# Patient Record
Sex: MALE | Race: WHITE | NOT HISPANIC OR LATINO | Employment: STUDENT | URBAN - METROPOLITAN AREA
[De-identification: names, ages, dates, MRNs, and addresses within clinical notes are randomized per-mention and may not be internally consistent; named-entity substitution may affect disease eponyms.]

---

## 2021-10-12 ENCOUNTER — TELEPHONE (OUTPATIENT)
Dept: ENDOCRINOLOGY | Facility: HOSPITAL | Age: 21
End: 2021-10-12

## 2021-10-14 ENCOUNTER — TELEPHONE (OUTPATIENT)
Dept: ENDOCRINOLOGY | Facility: HOSPITAL | Age: 21
End: 2021-10-14

## 2021-10-14 ENCOUNTER — CONSULT (OUTPATIENT)
Dept: ENDOCRINOLOGY | Facility: HOSPITAL | Age: 21
End: 2021-10-14
Payer: COMMERCIAL

## 2021-10-14 ENCOUNTER — OFFICE VISIT (OUTPATIENT)
Dept: DIABETES SERVICES | Facility: HOSPITAL | Age: 21
End: 2021-10-14
Payer: COMMERCIAL

## 2021-10-14 VITALS
HEIGHT: 72 IN | HEART RATE: 96 BPM | DIASTOLIC BLOOD PRESSURE: 70 MMHG | SYSTOLIC BLOOD PRESSURE: 110 MMHG | WEIGHT: 178.2 LBS | BODY MASS INDEX: 24.14 KG/M2

## 2021-10-14 VITALS — BODY MASS INDEX: 24.11 KG/M2 | HEIGHT: 72 IN | WEIGHT: 178 LBS

## 2021-10-14 DIAGNOSIS — E10.9 TYPE 1 DIABETES MELLITUS WITHOUT COMPLICATION (HCC): Primary | ICD-10-CM

## 2021-10-14 PROCEDURE — 99244 OFF/OP CNSLTJ NEW/EST MOD 40: CPT | Performed by: INTERNAL MEDICINE

## 2021-10-14 PROCEDURE — G0108 DIAB MANAGE TRN  PER INDIV: HCPCS | Performed by: DIETITIAN, REGISTERED

## 2021-10-14 RX ORDER — DEXMETHYLPHENIDATE HYDROCHLORIDE 10 MG/1
10 CAPSULE, EXTENDED RELEASE ORAL DAILY
COMMUNITY
Start: 2021-10-11

## 2021-10-14 RX ORDER — BLOOD SUGAR DIAGNOSTIC
STRIP MISCELLANEOUS
Qty: 100 EACH | Refills: 5 | Status: SHIPPED | OUTPATIENT
Start: 2021-10-14

## 2021-10-14 RX ORDER — INSULIN GLARGINE 100 [IU]/ML
INJECTION, SOLUTION SUBCUTANEOUS
Qty: 15 ML | Refills: 3 | Status: SHIPPED | OUTPATIENT
Start: 2021-10-14 | End: 2021-10-20 | Stop reason: SDUPTHER

## 2021-10-14 RX ORDER — GLUCAGON 3 MG/1
POWDER NASAL
Qty: 1 EACH | Refills: 0 | Status: SHIPPED | OUTPATIENT
Start: 2021-10-14

## 2021-10-14 RX ORDER — FLUOXETINE HYDROCHLORIDE 40 MG/1
80 CAPSULE ORAL DAILY
COMMUNITY
Start: 2021-10-06

## 2021-10-14 RX ORDER — BLOOD-GLUCOSE METER
EACH MISCELLANEOUS
Qty: 1 KIT | Refills: 0 | Status: SHIPPED | OUTPATIENT
Start: 2021-10-14

## 2021-10-14 RX ORDER — PEN NEEDLE, DIABETIC 32GX 5/32"
NEEDLE, DISPOSABLE MISCELLANEOUS
Qty: 100 EACH | Refills: 3 | Status: SHIPPED | OUTPATIENT
Start: 2021-10-14 | End: 2022-02-22 | Stop reason: SDUPTHER

## 2021-10-14 RX ORDER — URINE ACETONE TEST STRIPS
STRIP MISCELLANEOUS
Qty: 25 EACH | Refills: 0 | Status: SHIPPED | OUTPATIENT
Start: 2021-10-14

## 2021-10-14 RX ORDER — LANCETS
EACH MISCELLANEOUS
Qty: 100 EACH | Refills: 3 | Status: SHIPPED | OUTPATIENT
Start: 2021-10-14

## 2021-10-21 ENCOUNTER — DOCUMENTATION (OUTPATIENT)
Dept: ENDOCRINOLOGY | Facility: HOSPITAL | Age: 21
End: 2021-10-21

## 2021-10-21 DIAGNOSIS — E10.9 TYPE 1 DIABETES MELLITUS WITHOUT COMPLICATION (HCC): Primary | ICD-10-CM

## 2021-10-21 RX ORDER — INSULIN LISPRO 100 [IU]/ML
INJECTION, SOLUTION INTRAVENOUS; SUBCUTANEOUS
Qty: 15 ML | Refills: 0 | Status: SHIPPED | OUTPATIENT
Start: 2021-10-21 | End: 2021-10-21

## 2021-10-25 ENCOUNTER — TELEPHONE (OUTPATIENT)
Dept: ENDOCRINOLOGY | Facility: HOSPITAL | Age: 21
End: 2021-10-25

## 2021-10-25 ENCOUNTER — PATIENT MESSAGE (OUTPATIENT)
Dept: ENDOCRINOLOGY | Facility: HOSPITAL | Age: 21
End: 2021-10-25

## 2021-11-01 ENCOUNTER — TELEPHONE (OUTPATIENT)
Dept: ENDOCRINOLOGY | Facility: CLINIC | Age: 21
End: 2021-11-01

## 2021-11-11 ENCOUNTER — TELEPHONE (OUTPATIENT)
Dept: ENDOCRINOLOGY | Facility: HOSPITAL | Age: 21
End: 2021-11-11

## 2021-11-15 LAB
25(OH)D3+25(OH)D2 SERPL-MCNC: 15.6 NG/ML (ref 30–100)
ALBUMIN SERPL-MCNC: 4.5 G/DL (ref 4.1–5.2)
ALBUMIN/CREAT UR: 3 MG/G CREAT (ref 0–29)
ALBUMIN/GLOB SERPL: 2 {RATIO} (ref 1.2–2.2)
ALP SERPL-CCNC: 80 IU/L (ref 44–121)
ALT SERPL-CCNC: 22 IU/L (ref 0–44)
AST SERPL-CCNC: 16 IU/L (ref 0–40)
BASOPHILS # BLD AUTO: 0.1 X10E3/UL (ref 0–0.2)
BASOPHILS NFR BLD AUTO: 1 %
BILIRUB SERPL-MCNC: 0.3 MG/DL (ref 0–1.2)
BUN SERPL-MCNC: 28 MG/DL (ref 6–20)
BUN/CREAT SERPL: 32 (ref 9–20)
CALCIUM SERPL-MCNC: 9.3 MG/DL (ref 8.7–10.2)
CHLORIDE SERPL-SCNC: 102 MMOL/L (ref 96–106)
CO2 SERPL-SCNC: 24 MMOL/L (ref 20–29)
CREAT SERPL-MCNC: 0.87 MG/DL (ref 0.76–1.27)
CREAT UR-MCNC: 182.6 MG/DL
ENDOMYSIUM IGA SER QL: NEGATIVE
EOSINOPHIL # BLD AUTO: 0.1 X10E3/UL (ref 0–0.4)
EOSINOPHIL NFR BLD AUTO: 2 %
ERYTHROCYTE [DISTWIDTH] IN BLOOD BY AUTOMATED COUNT: 12.3 % (ref 11.6–15.4)
GLIADIN PEPTIDE IGA SER-ACNC: 6 UNITS (ref 0–19)
GLIADIN PEPTIDE IGG SER-ACNC: 7 UNITS (ref 0–19)
GLOBULIN SER-MCNC: 2.2 G/DL (ref 1.5–4.5)
GLUCOSE SERPL-MCNC: 92 MG/DL (ref 65–99)
HCT VFR BLD AUTO: 44.7 % (ref 37.5–51)
HGB BLD-MCNC: 15.2 G/DL (ref 13–17.7)
IGA SERPL-MCNC: 214 MG/DL (ref 90–386)
IMM GRANULOCYTES # BLD: 0.1 X10E3/UL (ref 0–0.1)
IMM GRANULOCYTES NFR BLD: 2 %
LYMPHOCYTES # BLD AUTO: 1.6 X10E3/UL (ref 0.7–3.1)
LYMPHOCYTES NFR BLD AUTO: 35 %
MCH RBC QN AUTO: 29 PG (ref 26.6–33)
MCHC RBC AUTO-ENTMCNC: 34 G/DL (ref 31.5–35.7)
MCV RBC AUTO: 85 FL (ref 79–97)
MICROALBUMIN UR-MCNC: 5.9 UG/ML
MONOCYTES # BLD AUTO: 0.4 X10E3/UL (ref 0.1–0.9)
MONOCYTES NFR BLD AUTO: 8 %
NEUTROPHILS # BLD AUTO: 2.5 X10E3/UL (ref 1.4–7)
NEUTROPHILS NFR BLD AUTO: 52 %
PLATELET # BLD AUTO: 190 X10E3/UL (ref 150–450)
POTASSIUM SERPL-SCNC: 4.6 MMOL/L (ref 3.5–5.2)
PROT SERPL-MCNC: 6.7 G/DL (ref 6–8.5)
RBC # BLD AUTO: 5.24 X10E6/UL (ref 4.14–5.8)
SL AMB EGFR AFRICAN AMERICAN: 143 ML/MIN/1.73
SL AMB EGFR NON AFRICAN AMERICAN: 123 ML/MIN/1.73
SODIUM SERPL-SCNC: 140 MMOL/L (ref 134–144)
T4 FREE SERPL-MCNC: 1.26 NG/DL (ref 0.82–1.77)
TSH SERPL DL<=0.005 MIU/L-ACNC: 1.5 UIU/ML (ref 0.45–4.5)
TTG IGA SER-ACNC: <2 U/ML (ref 0–3)
TTG IGG SER-ACNC: 4 U/ML (ref 0–5)
WBC # BLD AUTO: 4.8 X10E3/UL (ref 3.4–10.8)

## 2021-11-24 ENCOUNTER — OFFICE VISIT (OUTPATIENT)
Dept: DIABETES SERVICES | Facility: HOSPITAL | Age: 21
End: 2021-11-24
Payer: COMMERCIAL

## 2021-11-24 VITALS — HEIGHT: 72 IN | BODY MASS INDEX: 24.11 KG/M2 | WEIGHT: 178 LBS

## 2021-11-24 DIAGNOSIS — E10.9 TYPE 1 DIABETES MELLITUS WITHOUT COMPLICATION (HCC): Primary | ICD-10-CM

## 2021-11-24 PROCEDURE — 97802 MEDICAL NUTRITION INDIV IN: CPT | Performed by: DIETITIAN, REGISTERED

## 2021-12-09 ENCOUNTER — TELEPHONE (OUTPATIENT)
Dept: ENDOCRINOLOGY | Facility: HOSPITAL | Age: 21
End: 2021-12-09

## 2022-01-03 DIAGNOSIS — E10.9 TYPE 1 DIABETES MELLITUS WITHOUT COMPLICATION (HCC): ICD-10-CM

## 2022-01-03 RX ORDER — BLOOD-GLUCOSE SENSOR
EACH MISCELLANEOUS
Qty: 3 EACH | Refills: 12 | Status: SHIPPED | OUTPATIENT
Start: 2022-01-03 | End: 2022-01-26 | Stop reason: SDUPTHER

## 2022-01-03 RX ORDER — BLOOD-GLUCOSE TRANSMITTER
EACH MISCELLANEOUS
Qty: 1 EACH | Refills: 3 | Status: SHIPPED | OUTPATIENT
Start: 2022-01-03 | End: 2022-01-26 | Stop reason: SDUPTHER

## 2022-01-06 LAB
ALBUMIN SERPL-MCNC: 4.6 G/DL (ref 4.1–5.2)
ALBUMIN/GLOB SERPL: 1.9 {RATIO} (ref 1.2–2.2)
ALP SERPL-CCNC: 93 IU/L (ref 44–121)
ALT SERPL-CCNC: 26 IU/L (ref 0–44)
AST SERPL-CCNC: 16 IU/L (ref 0–40)
BASOPHILS # BLD AUTO: 0 X10E3/UL (ref 0–0.2)
BASOPHILS NFR BLD AUTO: 1 %
BILIRUB SERPL-MCNC: 0.3 MG/DL (ref 0–1.2)
BUN SERPL-MCNC: 21 MG/DL (ref 6–20)
BUN/CREAT SERPL: 20 (ref 9–20)
CALCIUM SERPL-MCNC: 9.8 MG/DL (ref 8.7–10.2)
CHLORIDE SERPL-SCNC: 99 MMOL/L (ref 96–106)
CHOLEST SERPL-MCNC: 293 MG/DL (ref 100–199)
CO2 SERPL-SCNC: 29 MMOL/L (ref 20–29)
CREAT SERPL-MCNC: 1.06 MG/DL (ref 0.76–1.27)
EOSINOPHIL # BLD AUTO: 0.1 X10E3/UL (ref 0–0.4)
EOSINOPHIL NFR BLD AUTO: 1 %
ERYTHROCYTE [DISTWIDTH] IN BLOOD BY AUTOMATED COUNT: 12.6 % (ref 11.6–15.4)
GLOBULIN SER-MCNC: 2.4 G/DL (ref 1.5–4.5)
GLUCOSE SERPL-MCNC: 88 MG/DL (ref 65–99)
HBA1C MFR BLD: 6.1 % (ref 4.8–5.6)
HCT VFR BLD AUTO: 46.4 % (ref 37.5–51)
HDLC SERPL-MCNC: 40 MG/DL
HGB BLD-MCNC: 15.7 G/DL (ref 13–17.7)
IMM GRANULOCYTES # BLD: 0.1 X10E3/UL (ref 0–0.1)
IMM GRANULOCYTES NFR BLD: 1 %
LDLC SERPL CALC-MCNC: 222 MG/DL (ref 0–99)
LDLC/HDLC SERPL: 5.6 RATIO (ref 0–3.6)
LYMPHOCYTES # BLD AUTO: 2.6 X10E3/UL (ref 0.7–3.1)
LYMPHOCYTES NFR BLD AUTO: 43 %
MCH RBC QN AUTO: 28.5 PG (ref 26.6–33)
MCHC RBC AUTO-ENTMCNC: 33.8 G/DL (ref 31.5–35.7)
MCV RBC AUTO: 84 FL (ref 79–97)
MONOCYTES # BLD AUTO: 0.5 X10E3/UL (ref 0.1–0.9)
MONOCYTES NFR BLD AUTO: 8 %
NEUTROPHILS # BLD AUTO: 2.8 X10E3/UL (ref 1.4–7)
NEUTROPHILS NFR BLD AUTO: 46 %
PLATELET # BLD AUTO: 198 X10E3/UL (ref 150–450)
POTASSIUM SERPL-SCNC: 4.9 MMOL/L (ref 3.5–5.2)
PROT SERPL-MCNC: 7 G/DL (ref 6–8.5)
RBC # BLD AUTO: 5.5 X10E6/UL (ref 4.14–5.8)
SL AMB EGFR AFRICAN AMERICAN: 115 ML/MIN/1.73
SL AMB EGFR NON AFRICAN AMERICAN: 100 ML/MIN/1.73
SL AMB VLDL CHOLESTEROL CALC: 31 MG/DL (ref 5–40)
SODIUM SERPL-SCNC: 139 MMOL/L (ref 134–144)
TRIGL SERPL-MCNC: 164 MG/DL (ref 0–149)
TSH SERPL DL<=0.005 MIU/L-ACNC: 1.76 UIU/ML (ref 0.45–4.5)
WBC # BLD AUTO: 6.1 X10E3/UL (ref 3.4–10.8)

## 2022-01-11 DIAGNOSIS — E10.9 TYPE 1 DIABETES MELLITUS WITHOUT COMPLICATION (HCC): Primary | ICD-10-CM

## 2022-01-11 RX ORDER — INSULIN GLARGINE 100 [IU]/ML
20 INJECTION, SOLUTION SUBCUTANEOUS DAILY
Qty: 15 ML | Refills: 2 | Status: SHIPPED | OUTPATIENT
Start: 2022-01-11 | End: 2022-01-14 | Stop reason: SDUPTHER

## 2022-01-14 ENCOUNTER — OFFICE VISIT (OUTPATIENT)
Dept: ENDOCRINOLOGY | Facility: HOSPITAL | Age: 22
End: 2022-01-14
Payer: COMMERCIAL

## 2022-01-14 VITALS
DIASTOLIC BLOOD PRESSURE: 80 MMHG | BODY MASS INDEX: 25.41 KG/M2 | HEIGHT: 72 IN | OXYGEN SATURATION: 97 % | SYSTOLIC BLOOD PRESSURE: 132 MMHG | WEIGHT: 187.6 LBS | HEART RATE: 90 BPM

## 2022-01-14 DIAGNOSIS — E10.9 TYPE 1 DIABETES MELLITUS WITHOUT COMPLICATION (HCC): Primary | ICD-10-CM

## 2022-01-14 PROCEDURE — 99214 OFFICE O/P EST MOD 30 MIN: CPT | Performed by: INTERNAL MEDICINE

## 2022-01-14 RX ORDER — INSULIN LISPRO 100 [IU]/ML
INJECTION, SOLUTION INTRAVENOUS; SUBCUTANEOUS
Qty: 15 ML | Refills: 5 | Status: SHIPPED | OUTPATIENT
Start: 2022-01-14 | End: 2022-08-08

## 2022-01-14 RX ORDER — TRAZODONE HYDROCHLORIDE 50 MG/1
150 TABLET ORAL
COMMUNITY
Start: 2022-01-13

## 2022-01-14 RX ORDER — INSULIN GLARGINE 100 [IU]/ML
INJECTION, SOLUTION SUBCUTANEOUS
Qty: 15 ML | Refills: 2
Start: 2022-01-14 | End: 2022-06-15 | Stop reason: SDUPTHER

## 2022-01-14 NOTE — PROGRESS NOTES
1/14/2022    Assessment/Plan      Diagnoses and all orders for this visit:    Type 1 diabetes mellitus without complication (HCC)  -     Hemoglobin A1C; Future  -     Comprehensive metabolic panel; Future  -     Lipid Panel with Direct LDL reflex; Future  -     CBC and differential; Future  -     TSH, 3rd generation; Future  -     C-peptide Lab Collect; Future  -     insulin glargine (Basaglar KwikPen) 100 units/mL injection pen; 16 units daily  -     insulin lispro (HumaLOG KwikPen) 100 units/mL injection pen; Up to 25 units daily    Other orders  -     traZODone (DESYREL) 50 mg tablet        Assessment/Plan:  1  Type 1 diabetes:  Overall based on A1c as well as Dexcom data he is very well controlled  To try and decrease some of his values that approach low and are low, we will decrease Basaglar to 16 units daily  Continue current Humalog regimen  Given that he is only requiring a low dose of mealtime insulin with 1 meal of the day and often does not appear to have a rise in blood sugars after carbohydrate intake with breakfast and lunch I believe he is in the honeymoon phase  We will need to monitor this closely  Will review dex com download again in a few weeks  Plan for follow-up in 3-4 months  We did briefly discussed considering carbohydrate counting as well as possible insulin pump technology though if he is in the honeymoon phase and not requiring significant amount of mealtime insulin such as would be required with flexible insulin therapy we will continue monitor on current regimen for now  CC: Diabetes follow-up    History of Present Illness     HPI: Aimee Malloy is a 24y o  year old male with type 1 diabetes for <1 year initially seen in our office for newly dx diabetes in October 2021  He is on insulin at home and takes Basaglar 20 unis daily, Novolog 3 units with dinner  He denies any polyuria, polydipsia, nocturia and blurry vision  He denies neuropathy, nephropathy and retinopathy  During workup he did have positive prema antibodies  Hypoglycemic episodes: Yes but infrequently  At 1st few appointments we did discuss treatment of hypoglycemia as well as he does have intranasal glucagon in case of severe hypoglycemia  Blood Sugar/Glucometer/Pump/CGM review:  Dex com download from 01/01 through 1/14 shows an average glucose of 115 with a standard deviation of 30 and glucose management index of 6 1%  93% of the time CGM is in use  95% values are within range and less 1% are low  Review of Systems   Constitutional: Negative for fatigue  HENT: Negative for trouble swallowing and voice change  Eyes: Negative for visual disturbance  Respiratory: Negative for shortness of breath  Cardiovascular: Negative for palpitations and leg swelling  Gastrointestinal: Negative for abdominal pain, nausea and vomiting  Endocrine: Negative for polydipsia and polyuria  Musculoskeletal: Negative for arthralgias and myalgias  Skin: Negative for rash  Neurological: Negative for dizziness, tremors and weakness  Hematological: Negative for adenopathy  Psychiatric/Behavioral: Negative for agitation and confusion  Historical Information   History reviewed  No pertinent past medical history  History reviewed  No pertinent surgical history    Social History   Social History     Substance and Sexual Activity   Alcohol Use Not Currently    Comment: some beer with dinner      Social History     Substance and Sexual Activity   Drug Use Never     Social History     Tobacco Use   Smoking Status Never Smoker   Smokeless Tobacco Never Used     Family History:   Family History   Problem Relation Age of Onset   [facility] Migraines Mother     Heart disease Father     Addiction problem Father     Diabetes unspecified Father     Pancreatitis Father     No Known Problems Sister     Heart disease Maternal Grandmother     Heart disease Maternal Grandfather     Prostate cancer Maternal Grandfather Meds/Allergies   Current Outpatient Medications   Medication Sig Dispense Refill    Accu-Chek FastClix Lancets MISC 4 times daily 100 each 3    acetone, urine, test (Ketostix) strip Use prn hyperglycemia 25 each 0    Blood Glucose Monitoring Suppl (Accu-Chek Marquita Plus) w/Device KIT daily 1 kit 0    Continuous Blood Gluc  (Dexcom G6 ) IRMA As directed 1 each 0    Continuous Blood Gluc Sensor (Dexcom G6 Sensor) MISC q 10 days 3 each 12    Continuous Blood Gluc Transmit (Dexcom G6 Transmitter) MISC q 90 days 1 each 3    FLUoxetine (PROzac) 40 MG capsule Take 80 mg by mouth daily      Glucagon (Baqsimi One Pack) 3 MG/DOSE POWD 3 mg prn severe hypoglycemia 1 each 0    glucose blood (Accu-Chek Marquita Plus) test strip 4 times daily 100 each 5    insulin glargine (Basaglar KwikPen) 100 units/mL injection pen 16 units daily 15 mL 2    Insulin Pen Needle (BD Pen Needle Ibis U/F) 32G X 4 MM MISC 4 times daily 100 each 3    traZODone (DESYREL) 50 mg tablet       Cholecalciferol 125 MCG (5000 UT) TABS 1 tab daily (Patient not taking: Reported on 1/14/2022 )      dexmethylphenidate (FOCALIN XR) 10 MG 24 hr capsule Take 10 mg by mouth daily (Patient not taking: Reported on 1/14/2022 )      insulin lispro (HumaLOG KwikPen) 100 units/mL injection pen Up to 25 units daily 15 mL 5     No current facility-administered medications for this visit  Allergies   Allergen Reactions    Bupropion Rash       Objective   Vitals: Blood pressure 132/80, pulse 90, height 6' (1 829 m), weight 85 1 kg (187 lb 9 6 oz), SpO2 97 %  Invasive Devices  Report    None                 Physical Exam  Vitals reviewed  Constitutional:       General: He is not in acute distress  Appearance: He is well-developed  He is not diaphoretic  HENT:      Head: Normocephalic and atraumatic  Eyes:      Conjunctiva/sclera: Conjunctivae normal       Pupils: Pupils are equal, round, and reactive to light     Neck: Thyroid: No thyromegaly  Cardiovascular:      Rate and Rhythm: Normal rate and regular rhythm  Pulmonary:      Effort: Pulmonary effort is normal  No respiratory distress  Breath sounds: Normal breath sounds  Abdominal:      General: Bowel sounds are normal       Palpations: Abdomen is soft  Musculoskeletal:         General: Normal range of motion  Cervical back: Normal range of motion and neck supple  Skin:     General: Skin is warm and dry  Findings: No rash  Neurological:      Mental Status: He is alert and oriented to person, place, and time  Motor: No abnormal muscle tone  Psychiatric:         Behavior: Behavior normal          The history was obtained from the review of the chart and from the patient  Lab Results:    Most recent Alc is  Lab Results   Component Value Date    HGBA1C 6 1 (H) 01/05/2022           No components found for: HA1C  No components found for: GLU    Lab Results   Component Value Date    CREATININE 1 06 01/05/2022    CREATININE 0 87 11/12/2021    BUN 21 (H) 01/05/2022    K 4 9 01/05/2022    CL 99 01/05/2022    CO2 29 01/05/2022     No results found for: EGFR  No components found for: [facility]    Lab Results   Component Value Date    HDL 40 01/05/2022    TRIG 164 (H) 01/05/2022       Lab Results   Component Value Date    ALT 26 01/05/2022    AST 16 01/05/2022       Lab Results   Component Value Date    TSH 1 760 01/05/2022    FREET4 1 26 11/12/2021             No future appointments  Portions of the record may have been created with voice recognition software  Occasional wrong word or "sound a like" substitutions may have occurred due to the inherent limitations of voice recognition software  Read the chart carefully and recognize, using context, where substitutions have occurred

## 2022-01-26 DIAGNOSIS — E10.9 TYPE 1 DIABETES MELLITUS WITHOUT COMPLICATION (HCC): ICD-10-CM

## 2022-01-26 RX ORDER — BLOOD-GLUCOSE TRANSMITTER
EACH MISCELLANEOUS
Qty: 1 EACH | Refills: 3 | Status: SHIPPED | OUTPATIENT
Start: 2022-01-26

## 2022-01-26 RX ORDER — BLOOD-GLUCOSE SENSOR
EACH MISCELLANEOUS
Qty: 9 EACH | Refills: 3 | Status: SHIPPED | OUTPATIENT
Start: 2022-01-26

## 2022-02-03 ENCOUNTER — TELEPHONE (OUTPATIENT)
Dept: ENDOCRINOLOGY | Facility: HOSPITAL | Age: 22
End: 2022-02-03

## 2022-02-04 ENCOUNTER — TELEPHONE (OUTPATIENT)
Dept: ENDOCRINOLOGY | Facility: HOSPITAL | Age: 22
End: 2022-02-04

## 2022-02-04 NOTE — TELEPHONE ENCOUNTER
I reviewed recent dex com download from 01/21 through 2/3 showing an average glucose of 135   87% values are within range  Standard deviation was 36  Overall blood sugars appear to be doing great  Continue current regimen  No changes

## 2022-02-22 DIAGNOSIS — E10.9 TYPE 1 DIABETES MELLITUS WITHOUT COMPLICATION (HCC): ICD-10-CM

## 2022-02-22 RX ORDER — PEN NEEDLE, DIABETIC 32GX 5/32"
NEEDLE, DISPOSABLE MISCELLANEOUS
Qty: 400 EACH | Refills: 3 | Status: SHIPPED | OUTPATIENT
Start: 2022-02-22

## 2022-04-12 LAB
ALBUMIN SERPL-MCNC: 4.6 G/DL (ref 4.1–5.2)
ALBUMIN/GLOB SERPL: 2 {RATIO} (ref 1.2–2.2)
ALP SERPL-CCNC: 86 IU/L (ref 44–121)
ALT SERPL-CCNC: 13 IU/L (ref 0–44)
AST SERPL-CCNC: 11 IU/L (ref 0–40)
BASOPHILS # BLD AUTO: 0 X10E3/UL (ref 0–0.2)
BASOPHILS NFR BLD AUTO: 0 %
BILIRUB SERPL-MCNC: 0.4 MG/DL (ref 0–1.2)
BUN SERPL-MCNC: 17 MG/DL (ref 6–20)
BUN/CREAT SERPL: 14 (ref 9–20)
C PEPTIDE SERPL-MCNC: 2.7 NG/ML (ref 1.1–4.4)
CALCIUM SERPL-MCNC: 9.2 MG/DL (ref 8.7–10.2)
CHLORIDE SERPL-SCNC: 101 MMOL/L (ref 96–106)
CHOLEST SERPL-MCNC: 253 MG/DL (ref 100–199)
CHOLEST/HDLC SERPL: 8.7 RATIO (ref 0–5)
CO2 SERPL-SCNC: 24 MMOL/L (ref 20–29)
CREAT SERPL-MCNC: 1.22 MG/DL (ref 0.76–1.27)
EGFR: 87 ML/MIN/1.73
EOSINOPHIL # BLD AUTO: 0.1 X10E3/UL (ref 0–0.4)
EOSINOPHIL NFR BLD AUTO: 1 %
ERYTHROCYTE [DISTWIDTH] IN BLOOD BY AUTOMATED COUNT: 12.6 % (ref 11.6–15.4)
GLOBULIN SER-MCNC: 2.3 G/DL (ref 1.5–4.5)
GLUCOSE SERPL-MCNC: 114 MG/DL (ref 65–99)
HBA1C MFR BLD: 5.7 % (ref 4.8–5.6)
HCT VFR BLD AUTO: 46 % (ref 37.5–51)
HDLC SERPL-MCNC: 29 MG/DL
HGB BLD-MCNC: 15.8 G/DL (ref 13–17.7)
IMM GRANULOCYTES # BLD: 0 X10E3/UL (ref 0–0.1)
IMM GRANULOCYTES NFR BLD: 0 %
LDLC SERPL CALC-MCNC: 179 MG/DL (ref 0–99)
LYMPHOCYTES # BLD AUTO: 1.9 X10E3/UL (ref 0.7–3.1)
LYMPHOCYTES NFR BLD AUTO: 41 %
MCH RBC QN AUTO: 28.8 PG (ref 26.6–33)
MCHC RBC AUTO-ENTMCNC: 34.3 G/DL (ref 31.5–35.7)
MCV RBC AUTO: 84 FL (ref 79–97)
MONOCYTES # BLD AUTO: 0.4 X10E3/UL (ref 0.1–0.9)
MONOCYTES NFR BLD AUTO: 9 %
NEUTROPHILS # BLD AUTO: 2.2 X10E3/UL (ref 1.4–7)
NEUTROPHILS NFR BLD AUTO: 49 %
PLATELET # BLD AUTO: 182 X10E3/UL (ref 150–450)
POTASSIUM SERPL-SCNC: 4.3 MMOL/L (ref 3.5–5.2)
PROT SERPL-MCNC: 6.9 G/DL (ref 6–8.5)
RBC # BLD AUTO: 5.48 X10E6/UL (ref 4.14–5.8)
SL AMB VLDL CHOLESTEROL CALC: 45 MG/DL (ref 5–40)
SODIUM SERPL-SCNC: 139 MMOL/L (ref 134–144)
TRIGL SERPL-MCNC: 233 MG/DL (ref 0–149)
TSH SERPL DL<=0.005 MIU/L-ACNC: 1.12 UIU/ML (ref 0.45–4.5)
WBC # BLD AUTO: 4.6 X10E3/UL (ref 3.4–10.8)

## 2022-04-22 ENCOUNTER — OFFICE VISIT (OUTPATIENT)
Dept: ENDOCRINOLOGY | Facility: HOSPITAL | Age: 22
End: 2022-04-22
Payer: COMMERCIAL

## 2022-04-22 VITALS
BODY MASS INDEX: 25.19 KG/M2 | DIASTOLIC BLOOD PRESSURE: 72 MMHG | HEIGHT: 72 IN | WEIGHT: 186 LBS | SYSTOLIC BLOOD PRESSURE: 130 MMHG | HEART RATE: 105 BPM

## 2022-04-22 DIAGNOSIS — E10.9 TYPE 1 DIABETES MELLITUS WITHOUT COMPLICATION (HCC): Primary | ICD-10-CM

## 2022-04-22 DIAGNOSIS — Z13.21 ENCOUNTER FOR VITAMIN DEFICIENCY SCREENING: ICD-10-CM

## 2022-04-22 PROCEDURE — 99214 OFFICE O/P EST MOD 30 MIN: CPT | Performed by: INTERNAL MEDICINE

## 2022-04-22 RX ORDER — DIPHENOXYLATE HYDROCHLORIDE AND ATROPINE SULFATE 2.5; .025 MG/1; MG/1
1 TABLET ORAL AS NEEDED
COMMUNITY

## 2022-04-22 RX ORDER — HYDROXYZINE PAMOATE 25 MG/1
25 CAPSULE ORAL AS NEEDED
COMMUNITY

## 2022-04-22 NOTE — PROGRESS NOTES
4/22/2022    Assessment/Plan      Diagnoses and all orders for this visit:    Type 1 diabetes mellitus without complication ([facility] )  -     Ambulatory referral to Diabetic Education; Future  -     Hemoglobin A1C; Future  -     Comprehensive metabolic panel; Future  -     TSH, 3rd generation; Future  -     Vitamin B12; Future  -     Iron Panel (Includes Ferritin, Iron Sat%, Iron, and TIBC); Future  -     Vitamin D 25 hydroxy Lab Collect; Future    Encounter for vitamin deficiency screening  -     Hemoglobin A1C; Future  -     Comprehensive metabolic panel; Future  -     TSH, 3rd generation; Future  -     Vitamin B12; Future  -     Iron Panel (Includes Ferritin, Iron Sat%, Iron, and TIBC); Future  -     Vitamin D 25 hydroxy Lab Collect; Future    Other orders  -     multivitamin (THERAGRAN) TABS; Take 1 tablet by mouth if needed  -     hydrOXYzine pamoate (VISTARIL) 25 mg capsule; Take 25 mg by mouth if needed for itching Up to 150MG a day        Assessment/Plan:  1  Type 1 diabetes:  Overall well controlled on current regimen  I believe he is still in the honeymoon phase  Will need to continue to monitor blood sugars closely and repeat labs prior to next appointment which will be in 3 months  Continue current regimen for now  Consider flexible insulin therapy and insulin pump technology in the future when he requires more bolus insulin  Follow-up with medical nutrition therapy  Nutritional labs that can be associated with type 1 diabetes ordered as requested by patient and his mother  CC: Diabetes follow-up    History of Present Illness     HPI: Carly Adrian is a 24y o  year old male with type 1 diabetes for <1 year  He is on insulin at home and takes Basaglar 16 units daily and Humalog 3 units with dinner  He denies any polyuria, polydipsia, nocturia and blurry vision  He denies neuropathy, nephropathy and retinopathy  Hypoglycemic episodes: No, rare   Does have intranasal glucagon in case of severe hypoglycemia  Blood Sugar/Glucometer/Pump/CGM review:  Dex com download from 04/09 through 4/22 shows an average glucose of 148 with a standard deviation of 34 and a glucose management index of 6 8 percent  84 percent values are within range  Less than 1 percent are low  Occasional hyperglycemia after around 8 a m /breakfast time  Rare blood sugars on the lower side after lunch  Review of Systems   Constitutional: Positive for fatigue  HENT: Negative for trouble swallowing and voice change  Eyes: Negative for visual disturbance  Respiratory: Negative for shortness of breath  Cardiovascular: Negative for palpitations and leg swelling  Gastrointestinal: Negative for abdominal pain, nausea and vomiting  Endocrine: Negative for polydipsia and polyuria  Musculoskeletal: Negative for arthralgias and myalgias  Skin: Negative for rash  Neurological: Negative for dizziness, tremors and weakness  Hematological: Negative for adenopathy  Psychiatric/Behavioral: Negative for agitation and confusion  Historical Information   History reviewed  No pertinent past medical history  History reviewed  No pertinent surgical history    Social History   Social History     Substance and Sexual Activity   Alcohol Use Not Currently    Comment: some beer with dinner      Social History     Substance and Sexual Activity   Drug Use Never     Social History     Tobacco Use   Smoking Status Never Smoker   Smokeless Tobacco Never Used     Family History:   Family History   Problem Relation Age of Onset   [facility] Migraines Mother     Breast cancer Mother     Heart disease Father     Addiction problem Father     Diabetes unspecified Father     Pancreatitis Father     No Known Problems Sister     Heart disease Maternal Grandmother     Heart disease Maternal Grandfather     Prostate cancer Maternal Grandfather        Meds/Allergies   Current Outpatient Medications   Medication Sig Dispense Refill    Accu-Chek FastClix Lancets MISC 4 times daily 100 each 3    acetone, urine, test (Ketostix) strip Use prn hyperglycemia 25 each 0    Blood Glucose Monitoring Suppl (Accu-Chek Marquita Plus) w/Device KIT daily 1 kit 0    Cholecalciferol 125 MCG (5000 UT) TABS 1 tab daily (Patient taking differently: As needed )      Continuous Blood Gluc  (Dexcom G6 ) IRMA As directed 1 each 0    Continuous Blood Gluc Sensor (Dexcom G6 Sensor) MISC q 10 days 9 each 3    Continuous Blood Gluc Transmit (Dexcom G6 Transmitter) MISC q 90 days 1 each 3    FLUoxetine (PROzac) 40 MG capsule Take 80 mg by mouth daily      Glucagon (Baqsimi One Pack) 3 MG/DOSE POWD 3 mg prn severe hypoglycemia 1 each 0    glucose blood (Accu-Chek Marquita Plus) test strip 4 times daily 100 each 5    hydrOXYzine pamoate (VISTARIL) 25 mg capsule Take 25 mg by mouth if needed for itching Up to 150MG a day      insulin glargine (Basaglar KwikPen) 100 units/mL injection pen 16 units daily 15 mL 2    insulin lispro (HumaLOG KwikPen) 100 units/mL injection pen Up to 25 units daily 15 mL 5    Insulin Pen Needle (BD Pen Needle Ibis U/F) 32G X 4 MM MISC 4 times daily 400 each 3    multivitamin (THERAGRAN) TABS Take 1 tablet by mouth if needed      traZODone (DESYREL) 50 mg tablet 150 mg        dexmethylphenidate (FOCALIN XR) 10 MG 24 hr capsule Take 10 mg by mouth daily (Patient not taking: Reported on 1/14/2022 )       No current facility-administered medications for this visit  Allergies   Allergen Reactions    Bupropion Rash       Objective   Vitals: Blood pressure 130/72, pulse 105, height 6' (1 829 m), weight 84 4 kg (186 lb)  Invasive Devices  Report    None                 Physical Exam  Vitals reviewed  Constitutional:       General: He is not in acute distress  Appearance: He is well-developed  He is not diaphoretic  HENT:      Head: Normocephalic and atraumatic     Eyes:      Conjunctiva/sclera: Conjunctivae normal  Pupils: Pupils are equal, round, and reactive to light  Neck:      Thyroid: No thyromegaly  Cardiovascular:      Rate and Rhythm: Normal rate and regular rhythm  Pulmonary:      Effort: Pulmonary effort is normal  No respiratory distress  Breath sounds: Normal breath sounds  Abdominal:      General: Bowel sounds are normal       Palpations: Abdomen is soft  Musculoskeletal:         General: Normal range of motion  Cervical back: Normal range of motion and neck supple  Skin:     General: Skin is warm and dry  Findings: No rash  Neurological:      Mental Status: He is alert and oriented to person, place, and time  Motor: No abnormal muscle tone  Psychiatric:         Behavior: Behavior normal          The history was obtained from the review of the chart and from the patient      Lab Results:    Most recent Alc is  Lab Results   Component Value Date    HGBA1C 5 7 (H) 04/11/2022           No components found for: HA1C  No components found for: GLU    Lab Results   Component Value Date    CREATININE 1 22 04/11/2022    CREATININE 1 06 01/05/2022    CREATININE 0 87 11/12/2021    BUN 17 04/11/2022    K 4 3 04/11/2022     04/11/2022    CO2 24 04/11/2022     eGFR   Date Value Ref Range Status   04/11/2022 87 >59 mL/min/1 73 Final     No components found for: [facility]    Lab Results   Component Value Date    HDL 29 (L) 04/11/2022    TRIG 233 (H) 04/11/2022    CHOLHDL 8 7 (H) 04/11/2022       Lab Results   Component Value Date    ALT 13 04/11/2022    AST 11 04/11/2022       Lab Results   Component Value Date    TSH 1 120 04/11/2022    FREET4 1 26 11/12/2021       Recent Results (from the past 08343 hour(s))   Hemoglobin A1C    Collection Time: 10/09/21 12:00 AM   Result Value Ref Range    Hemoglobin A1C 9 2    Celiac Disease Antibody Profile    Collection Time: 11/12/21  9:52 AM   Result Value Ref Range    Gliadin IgA 6 0 - 19 units    Gliadin IgG 7 0 - 19 units    TISSUE TRANSGLUTAMINASE IGA <2 0 - 3 U/mL    Tissue Transglut Ab IGG 4 0 - 5 U/mL    Endomysial IgA Negative Negative    IgA 214 90 - 386 mg/dL   Ambig Abbrev Default    Collection Time: 11/12/21  9:52 AM   Result Value Ref Range    White Blood Cell Count 4 8 3 4 - 10 8 x10E3/uL    Red Blood Cell Count 5 24 4 14 - 5 80 x10E6/uL    Hemoglobin 15 2 13 0 - 17 7 g/dL    HCT 44 7 37 5 - 51 0 %    MCV 85 79 - 97 fL    MCH 29 0 26 6 - 33 0 pg    MCHC 34 0 31 5 - 35 7 g/dL    RDW 12 3 11 6 - 15 4 %    Platelet Count 857 804 - 450 x10E3/uL    Neutrophils 52 Not Estab  %    Lymphocytes 35 Not Estab  %    Monocytes 8 Not Estab  %    Eosinophils 2 Not Estab  %    Basophils PCT 1 Not Estab  %    Neutrophils (Absolute) 2 5 1 4 - 7 0 x10E3/uL    Lymphocytes (Absolute) 1 6 0 7 - 3 1 x10E3/uL    Monocytes (Absolute) 0 4 0 1 - 0 9 x10E3/uL    Eosinophils (Absolute) 0 1 0 0 - 0 4 x10E3/uL    Basophils ABS 0 1 0 0 - 0 2 x10E3/uL    Immature Granulocytes 2 Not Estab  %    Immature Granulocytes (Absolute) 0 1 0 0 - 0 1 x10E3/uL   Comprehensive metabolic panel    Collection Time: 11/12/21  9:52 AM   Result Value Ref Range    Glucose, Random 92 65 - 99 mg/dL    BUN 28 (H) 6 - 20 mg/dL    Creatinine 0 87 0 76 - 1 27 mg/dL    eGFR Non African American 123 >59 mL/min/1 73    eGFR  143 >59 mL/min/1 73    SL AMB BUN/CREATININE RATIO 32 (H) 9 - 20    Sodium 140 134 - 144 mmol/L    Potassium 4 6 3 5 - 5 2 mmol/L    Chloride 102 96 - 106 mmol/L    CO2 24 20 - 29 mmol/L    CALCIUM 9 3 8 7 - 10 2 mg/dL    Protein, Total 6 7 6 0 - 8 5 g/dL    Albumin 4 5 4 1 - 5 2 g/dL    Globulin, Total 2 2 1 5 - 4 5 g/dL    Albumin/Globulin Ratio 2 0 1 2 - 2 2    TOTAL BILIRUBIN 0 3 0 0 - 1 2 mg/dL    Alk Phos Isoenzymes 80 44 - 121 IU/L    AST 16 0 - 40 IU/L    ALT 22 0 - 44 IU/L   Microalbumin / creatinine urine ratio    Collection Time: 11/12/21  9:52 AM   Result Value Ref Range    Creatinine, Urine 182 6 Not Estab  mg/dL    Microalbum  ,U,Random 5 9 Not Estab  ug/mL    Microalb/Creat Ratio 3 0 - 29 mg/g creat   T4, free    Collection Time: 11/12/21  9:52 AM   Result Value Ref Range    Free t4 1 26 0 82 - 1 77 ng/dL   TSH, 3rd generation    Collection Time: 11/12/21  9:52 AM   Result Value Ref Range    TSH 1 500 0 450 - 4 500 uIU/mL   Vitamin D 25 hydroxy    Collection Time: 11/12/21  9:52 AM   Result Value Ref Range    25-HYDROXY VIT D 15 6 (L) 30 0 - 100 0 ng/mL   Earl Tadeo Default    Collection Time: 01/05/22 10:21 AM   Result Value Ref Range    White Blood Cell Count 6 1 3 4 - 10 8 x10E3/uL    Red Blood Cell Count 5 50 4  14 - 5 80 x10E6/uL    Hemoglobin 15 7 13 0 - 17 7 g/dL    HCT 46 4 37 5 - 51 0 %    MCV 84 79 - 97 fL    MCH 28 5 26 6 - 33 0 pg    MCHC 33 8 31 5 - 35 7 g/dL    RDW 12 6 11 6 - 15 4 %    Platelet Count 005 460 - 450 x10E3/uL    Neutrophils 46 Not Estab  %    Lymphocytes 43 Not Estab  %    Monocytes 8 Not Estab  %    Eosinophils 1 Not Estab  %    Basophils PCT 1 Not Estab  %    Neutrophils (Absolute) 2 8 1 4 - 7 0 x10E3/uL    Lymphocytes (Absolute) 2 6 0 7 - 3 1 x10E3/uL    Monocytes (Absolute) 0 5 0 1 - 0 9 x10E3/uL    Eosinophils (Absolute) 0 1 0 0 - 0 4 x10E3/uL    Basophils ABS 0 0 0 0 - 0 2 x10E3/uL    Immature Granulocytes 1 Not Estab  %    Immature Granulocytes (Absolute) 0 1 0 0 - 0 1 x10E3/uL   Comprehensive metabolic panel    Collection Time: 01/05/22 10:21 AM   Result Value Ref Range    Glucose, Random 88 65 - 99 mg/dL    BUN 21 (H) 6 - 20 mg/dL    Creatinine 1 06 0 76 - 1 27 mg/dL    eGFR Non African American 100 >59 mL/min/1 73    eGFR  115 >59 mL/min/1 73    SL AMB BUN/CREATININE RATIO 20 9 - 20    Sodium 139 134 - 144 mmol/L    Potassium 4 9 3 5 - 5 2 mmol/L    Chloride 99 96 - 106 mmol/L    CO2 29 20 - 29 mmol/L    CALCIUM 9 8 8 7 - 10 2 mg/dL    Protein, Total 7 0 6 0 - 8 5 g/dL    Albumin 4 6 4 1 - 5 2 g/dL    Globulin, Total 2 4 1 5 - 4 5 g/dL    Albumin/Globulin Ratio 1 9 1 2 - 2 2    TOTAL BILIRUBIN 0 3 0 0 - 1 2 mg/dL    Alk Phos Isoenzymes 93 44 - 121 IU/L    AST 16 0 - 40 IU/L    ALT 26 0 - 44 IU/L   Lipid Panel with Direct LDL reflex    Collection Time: 01/05/22 10:21 AM   Result Value Ref Range    Cholesterol, Total 293 (H) 100 - 199 mg/dL    Triglycerides 164 (H) 0 - 149 mg/dL    HDL 40 >39 mg/dL    VLDL Cholesterol Calculated 31 5 - 40 mg/dL    LDL Calculated 222 (H) 0 - 99 mg/dL    LDl/HDL Ratio 5 6 (H) 0 0 - 3 6 ratio   Hemoglobin A1c (w/out EAG)    Collection Time: 01/05/22 10:21 AM   Result Value Ref Range    Hemoglobin A1C 6 1 (H) 4 8 - 5 6 %   TSH, 3rd generation    Collection Time: 01/05/22 10:21 AM   Result Value Ref Range    TSH 1 760 0 450 - 4 500 uIU/mL   Ambig Abbrev Default    Collection Time: 04/11/22  7:31 AM   Result Value Ref Range    White Blood Cell Count 4 6 3 4 - 10 8 x10E3/uL    Red Blood Cell Count 5 48 4 14 - 5 80 x10E6/uL    Hemoglobin 15 8 13 0 - 17 7 g/dL    HCT 46 0 37 5 - 51 0 %    MCV 84 79 - 97 fL    MCH 28 8 26 6 - 33 0 pg    MCHC 34 3 31 5 - 35 7 g/dL    RDW 12 6 11 6 - 15 4 %    Platelet Count 417 270 - 450 x10E3/uL    Neutrophils 49 Not Estab  %    Lymphocytes 41 Not Estab  %    Monocytes 9 Not Estab  %    Eosinophils 1 Not Estab  %    Basophils PCT 0 Not Estab  %    Neutrophils (Absolute) 2 2 1 4 - 7 0 x10E3/uL    Lymphocytes (Absolute) 1 9 0 7 - 3 1 x10E3/uL    Monocytes (Absolute) 0 4 0 1 - 0 9 x10E3/uL    Eosinophils (Absolute) 0 1 0 0 - 0 4 x10E3/uL    Basophils ABS 0 0 0 0 - 0 2 x10E3/uL    Immature Granulocytes 0 Not Estab  %    Immature Granulocytes (Absolute) 0 0 0 0 - 0 1 x10E3/uL   Comprehensive metabolic panel    Collection Time: 04/11/22  7:31 AM   Result Value Ref Range    Glucose, Random 114 (H) 65 - 99 mg/dL    BUN 17 6 - 20 mg/dL    Creatinine 1 22 0 76 - 1 27 mg/dL    eGFR 87 >59 mL/min/1 73    SL AMB BUN/CREATININE RATIO 14 9 - 20    Sodium 139 134 - 144 mmol/L    Potassium 4 3 3 5 - 5 2 mmol/L    Chloride 101 96 - 106 mmol/L    CO2 24 20 - 29 mmol/L CALCIUM 9 2 8 7 - 10 2 mg/dL    Protein, Total 6 9 6 0 - 8 5 g/dL    Albumin 4 6 4 1 - 5 2 g/dL    Globulin, Total 2 3 1 5 - 4 5 g/dL    Albumin/Globulin Ratio 2 0 1 2 - 2 2    TOTAL BILIRUBIN 0 4 0 0 - 1 2 mg/dL    Alk Phos Isoenzymes 86 44 - 121 IU/L    AST 11 0 - 40 IU/L    ALT 13 0 - 44 IU/L   LIPID PANEL WITH CHOL/HDL RATIO    Collection Time: 04/11/22  7:31 AM   Result Value Ref Range    Cholesterol, Total 253 (H) 100 - 199 mg/dL    Triglycerides 233 (H) 0 - 149 mg/dL    HDL 29 (L) >39 mg/dL    VLDL Cholesterol Calculated 45 (H) 5 - 40 mg/dL    LDL Calculated 179 (H) 0 - 99 mg/dL    T  Chol/HDL Ratio 8 7 (H) 0 0 - 5 0 ratio   Hemoglobin A1c (w/out EAG)    Collection Time: 04/11/22  7:31 AM   Result Value Ref Range    Hemoglobin A1C 5 7 (H) 4 8 - 5 6 %   TSH, 3rd generation    Collection Time: 04/11/22  7:31 AM   Result Value Ref Range    TSH 1 120 0 450 - 4 500 uIU/mL   C-peptide    Collection Time: 04/11/22  7:31 AM   Result Value Ref Range    C-Peptide 2 7 1 1 - 4 4 ng/mL         No future appointments  Portions of the record may have been created with voice recognition software  Occasional wrong word or "sound a like" substitutions may have occurred due to the inherent limitations of voice recognition software  Read the chart carefully and recognize, using context, where substitutions have occurred

## 2022-04-25 ENCOUNTER — PATIENT MESSAGE (OUTPATIENT)
Dept: ENDOCRINOLOGY | Facility: HOSPITAL | Age: 22
End: 2022-04-25

## 2022-04-25 ENCOUNTER — TELEPHONE (OUTPATIENT)
Dept: OTHER | Facility: HOSPITAL | Age: 22
End: 2022-04-25

## 2022-04-27 NOTE — TELEPHONE ENCOUNTER
Please let patient and/or his mother know I reviewed dex com download particularly the past few days and I do note the pattern of higher numbers overnight  Can we confirm if he has been taking Humalog 3 units with dinner? In the more recent past he has not needed this so he has not been taking it often  Dex com download from 04/13 through 4/26 shows an average glucose 165 with 69% values within range, less than 1% low, standard deviation 51, glucose management index 7 3%  Blood sugars the past few days are running higher particularly after around 6 or 7:00 p m  Marcello Nick

## 2022-05-02 ENCOUNTER — TELEPHONE (OUTPATIENT)
Dept: ENDOCRINOLOGY | Facility: HOSPITAL | Age: 22
End: 2022-05-02

## 2022-05-02 NOTE — TELEPHONE ENCOUNTER
Called and left voicemail requesting call back  In the meantime are we able to download an updated dexcom report?

## 2022-05-02 NOTE — TELEPHONE ENCOUNTER
Spoke to patient  The past few days blood sugars have been increasing significantly particularly after lunch and dinner  Discussed that he may be coming out of the honeymoon phase and will need to be taking more structured mealtime regimen  He will continue his current Basaglar but with Humalog he will start 3 units with lunch and 5 units with dinner +1 extra unit for every 50 above 200  He will report back later this week let me know how things are going and I can review dex com download at that time

## 2022-05-02 NOTE — TELEPHONE ENCOUNTER
Received voicemail from patient's mother  She reports fluctuating blood sugars, patient has not been feeling well, and is concerned the dexcom is not working properly  She would like to speak with you directly

## 2022-05-08 LAB
25(OH)D3+25(OH)D2 SERPL-MCNC: 19.9 NG/ML (ref 30–100)
ALBUMIN SERPL-MCNC: 4.5 G/DL (ref 4.1–5.2)
ALBUMIN/GLOB SERPL: 2 {RATIO} (ref 1.2–2.2)
ALP SERPL-CCNC: 99 IU/L (ref 44–121)
ALT SERPL-CCNC: 19 IU/L (ref 0–44)
AST SERPL-CCNC: 11 IU/L (ref 0–40)
BILIRUB SERPL-MCNC: 0.4 MG/DL (ref 0–1.2)
BUN SERPL-MCNC: 18 MG/DL (ref 6–20)
BUN/CREAT SERPL: 18 (ref 9–20)
CALCIUM SERPL-MCNC: 9.5 MG/DL (ref 8.7–10.2)
CHLORIDE SERPL-SCNC: 98 MMOL/L (ref 96–106)
CO2 SERPL-SCNC: 25 MMOL/L (ref 20–29)
CREAT SERPL-MCNC: 1.01 MG/DL (ref 0.76–1.27)
EGFR: 109 ML/MIN/1.73
FERRITIN SERPL-MCNC: 125 NG/ML (ref 30–400)
GLOBULIN SER-MCNC: 2.3 G/DL (ref 1.5–4.5)
GLUCOSE SERPL-MCNC: 177 MG/DL (ref 65–99)
IRON SATN MFR SERPL: 45 % (ref 15–55)
IRON SERPL-MCNC: 131 UG/DL (ref 38–169)
METHYLMALONATE SERPL-SCNC: 197 NMOL/L (ref 0–378)
POTASSIUM SERPL-SCNC: 4.9 MMOL/L (ref 3.5–5.2)
PROT SERPL-MCNC: 6.8 G/DL (ref 6–8.5)
SODIUM SERPL-SCNC: 136 MMOL/L (ref 134–144)
TIBC SERPL-MCNC: 292 UG/DL (ref 250–450)
UIBC SERPL-MCNC: 161 UG/DL (ref 111–343)
VIT B12 SERPL-MCNC: 617 PG/ML (ref 232–1245)

## 2022-05-11 ENCOUNTER — TELEMEDICINE (OUTPATIENT)
Dept: DIABETES SERVICES | Facility: HOSPITAL | Age: 22
End: 2022-05-11
Payer: COMMERCIAL

## 2022-05-11 DIAGNOSIS — E10.9 TYPE 1 DIABETES MELLITUS WITHOUT COMPLICATION (HCC): Primary | ICD-10-CM

## 2022-05-11 PROCEDURE — G0108 DIAB MANAGE TRN  PER INDIV: HCPCS | Performed by: DIETITIAN, REGISTERED

## 2022-05-11 NOTE — PROGRESS NOTES
Diabetes DSME    HPI: Met with Sally Flaherty for DSME Follow-up visit  Virtual visit today with his mom  He had his follow-up visit with Dr Garret Murray a couple weeks ago, A1c 5 7%, no changes, he was doing very well  Since then it appears that his honeymoon phase is over, blood sugars have been rising  Current Dexcom download shows in goal range 34%, high 39%, very high 26%, no low blood sugar  Doctor Garret Murray recently changed him to starting mealtime insulin, 3 units with lunch, 5 units with dinner, plus correction scale  I reviewed things with doctor Garret Murray prior to our visit, Garret Murray is fine with the starting him with flexible insulin therapy if they feel like they are ready for that  After conversation they are ready to make that transition, we had previously discussed it at our initial Education sessions a few months ago  Based on his eating and current doses, he will begin an insulin to carb ratio of 1-10, correction factor 1-50 with goal of 150  We practiced doing some of this together, they stated make sense  They are also interested in the idea of moving towards an insulin pump, I gave them the names of the 3 primary insulin pumps on the market for them to start doing some research into what would be best for them, they feel like they would be interested in the T slim or the Omnipod pump  I am happy to meet with them for a pump assessment visit, or if they feel confident in their decision after doing the research they are welcome to start the ordering process themselves  Mom had some questions of general healthy overall eating for improve nutritional status and increased energy  We had initially set a goal of 30-45 carbs per meal because they had basically cut out most carbs at the start of diagnosis and he was not on insulin during the honeymoon phase  Discussed that 45-60 carbs per meal is an appropriate amount for a man his age    With transitioning to flexible insulin, he can eat a little more it is meals and we will adjust his insulin accordingly  Encouraged him to have most of his carbohydrates at his meals when he takes insulin, and snacks in between to be mostly carb free at this point in time  Low energy continues to be an issue, his vitamin-D was low, they were just taking some vitamin-D supplements from [address] [place] [address], mom will  the prescription for the higher dose of vitamin D that had been ordered at the office visit in the fall and he will start taking that  Discussed using a daily multivitamin consistently as Selina Brooks tends to be a picky eater and does not eat many vegetables  I will have doctor Peyton Laguna review his Dexcom in 1 week to see how progresses coming with the transition to flexible insulin and any changes that are needed  Ht Readings from Last 1 Encounters:   04/22/22 6' (1 829 m)     Wt Readings from Last 3 Encounters:   04/22/22 84 4 kg (186 lb)   01/14/22 85 1 kg (187 lb 9 6 oz)   11/24/21 80 7 kg (178 lb)        There is no height or weight on file to calculate BMI  Lab Results   Component Value Date    HGBA1C 5 7 (H) 04/11/2022    HGBA1C 6 1 (H) 01/05/2022    HGBA1C 9 2 10/09/2021       No results found for: CHOL  Lab Results   Component Value Date    HDL 29 (L) 04/11/2022    HDL 40 01/05/2022     Lab Results   Component Value Date    LDLCALC 179 (H) 04/11/2022    LDLCALC 222 (H) 01/05/2022     Lab Results   Component Value Date    TRIG 233 (H) 04/11/2022    TRIG 164 (H) 01/05/2022     Lab Results   Component Value Date    CHOLHDL 8 7 (H) 04/11/2022     No results found for: Media Liter    Diabetes Education Record  Selina Brooks received the following handouts: none today      Patient response to instruction    Comprehensiongood  Motivationgood  Expected Compliancegood    Thank you for referring your patient to [facility], it was a pleasure working with them today   Please feel free to call with any questions or concerns  Donald Lynch RD  [address] [facility]  [address] [facility] [address]    Virtual Regular Visit    Verification of patient location:    Patient is located in the following state in which I hold an active license [place]      Assessment/Plan:    Problem List Items Addressed This Visit        Endocrine    Type 1 diabetes mellitus without complication ([address] )               Reason for visit is   Chief Complaint   Patient presents with    Virtual Regular Visit        Encounter provider Donald Lynch RD    Provider located at [place] [address] [place] [address]      Recent Visits  No visits were found meeting these conditions  Showing recent visits within past 7 days and meeting all other requirements  Today's Visits  Date Type Provider Dept   05/11/22 Telemedicine Donald Lynch RD [facility] Villafuerte today's visits and meeting all other requirements  Future Appointments  No visits were found meeting these conditions  Showing future appointments within next 150 days and meeting all other requirements       The patient was identified by name and date of birth  Nevin Gonzalez was informed that this is a telemedicine visit and that the visit is being conducted through [address] [place] [address] Now and patient was informed that this is a secure, HIPAA-compliant platform  He agrees to proceed     My office door was closed  No one else was in the room  He acknowledged consent and understanding of privacy and security of the video platform  The patient has agreed to participate and understands they can discontinue the visit at any time  Patient is aware this is a billable service  Subjective  Nevin Gonzalez is a 24 y o  male see notes above   HPI     No past medical history on file  No past surgical history on file      Current Outpatient Medications   Medication Sig Dispense Refill    Accu-Chek FastClix Lancets MISC 4 times daily 100 each 3    acetone, urine, test (Ketostix) strip Use prn hyperglycemia 25 each 0    Blood Glucose Monitoring Suppl (Accu-Chek Marquita Plus) w/Device KIT daily 1 kit 0    Cholecalciferol 125 MCG (5000 UT) TABS 1 tab daily (Patient taking differently: As needed )      Continuous Blood Gluc  (Dexcom G6 ) IRMA As directed 1 each 0    Continuous Blood Gluc Sensor (Dexcom G6 Sensor) MISC q 10 days 9 each 3    Continuous Blood Gluc Transmit (Dexcom G6 Transmitter) MISC q 90 days 1 each 3    dexmethylphenidate (FOCALIN XR) 10 MG 24 hr capsule Take 10 mg by mouth daily (Patient not taking: Reported on 1/14/2022 )      FLUoxetine (PROzac) 40 MG capsule Take 80 mg by mouth daily      Glucagon (Baqsimi One Pack) 3 MG/DOSE POWD 3 mg prn severe hypoglycemia 1 each 0    glucose blood (Accu-Chek Marquita Plus) test strip 4 times daily 100 each 5    hydrOXYzine pamoate (VISTARIL) 25 mg capsule Take 25 mg by mouth if needed for itching Up to 150MG a day      insulin glargine (Basaglar KwikPen) 100 units/mL injection pen 16 units daily 15 mL 2    insulin lispro (HumaLOG KwikPen) 100 units/mL injection pen Up to 25 units daily 15 mL 5    Insulin Pen Needle (BD Pen Needle Ibis U/F) 32G X 4 MM MISC 4 times daily 400 each 3    multivitamin (THERAGRAN) TABS Take 1 tablet by mouth if needed      traZODone (DESYREL) 50 mg tablet 150 mg         No current facility-administered medications for this visit  Allergies   Allergen Reactions    Bupropion Rash       Review of Systems    Video Exam    There were no vitals filed for this visit  Physical Exam     I spent 45 minutes with patient today in which greater than 50% of the time was spent in counseling/coordination of care regarding diabetes    VIRTUAL VISIT [address] verbally agrees to participate in [employer]   Pt is aware that Virtual Care Services could be limited without vital signs or the ability to perform a full hands-on physical [place] Sans understands he or the provider may request at any time to terminate the video visit and request the patient to seek care or treatment in person

## 2022-05-11 NOTE — Clinical Note
Diabetes ed completed- for your review  Want to set a reminder for yourself for 1 week for us to review his dexcom? And have the girls/me just pull 1 week, not 2?   Thanks! -Halima Hunt

## 2022-05-27 DIAGNOSIS — E55.9 VITAMIN D DEFICIENCY: ICD-10-CM

## 2022-05-27 DIAGNOSIS — E10.9 TYPE 1 DIABETES MELLITUS WITHOUT COMPLICATION (HCC): Primary | ICD-10-CM

## 2022-05-27 RX ORDER — ERGOCALCIFEROL 1.25 MG/1
CAPSULE ORAL
Qty: 4 CAPSULE | Refills: 5 | Status: SHIPPED | OUTPATIENT
Start: 2022-05-27 | End: 2022-05-27

## 2022-06-01 ENCOUNTER — TELEPHONE (OUTPATIENT)
Dept: ENDOCRINOLOGY | Facility: HOSPITAL | Age: 22
End: 2022-06-01

## 2022-06-01 NOTE — TELEPHONE ENCOUNTER
Received call from patient advocate service whom is contracted with the patient's parent's employer  They state the [facility], but the patient's mother believes the Dash 5 would be a better choice for the patient due to his depression  Service stated [employer] has started an appeal for this and has reached out to our office  Service was infromed we will look into this and contact the patient's mother  At this time the office has not received any correspondence fron Omnipod  Please advise

## 2022-06-01 NOTE — TELEPHONE ENCOUNTER
Spoke with Julissa at [employer]  She will follow up with the office later in the week once she verifies the insurance benefits  At this point if the coverage is restricted, the patient can start and Dash and transition to the 5 when possible  Currently the 5 is in controlled release and they are having supply issues

## 2022-06-10 NOTE — TELEPHONE ENCOUNTER
Paralee Mail from [employer] patient advocate called ([phone]) She notes that the patients insurance company is requiring a letter of medical necessity for the Omnipod 5 stating that he would not have to check his blood sugars all of the time which causes his depression  She did not provide a fax number

## 2022-06-13 NOTE — TELEPHONE ENCOUNTER
Unable to reach anyone at [facility]   I left a message for Ana Herman to see if she had a fax number

## 2022-06-15 ENCOUNTER — PATIENT MESSAGE (OUTPATIENT)
Dept: ENDOCRINOLOGY | Facility: HOSPITAL | Age: 22
End: 2022-06-15

## 2022-06-15 ENCOUNTER — TELEPHONE (OUTPATIENT)
Dept: ENDOCRINOLOGY | Facility: HOSPITAL | Age: 22
End: 2022-06-15

## 2022-06-15 DIAGNOSIS — E10.9 TYPE 1 DIABETES MELLITUS WITHOUT COMPLICATION (HCC): ICD-10-CM

## 2022-06-15 RX ORDER — INSULIN GLARGINE 100 [IU]/ML
INJECTION, SOLUTION SUBCUTANEOUS
Qty: 15 ML | Refills: 2
Start: 2022-06-15

## 2022-06-15 NOTE — TELEPHONE ENCOUNTER
Reviewed Dexcom data as listed below  Based on this pattern I would suggest increasing his Basaglar regimen  I have that he is on 16 units daily  Would increase 18 units daily and review her Dexcom download again in about 1-2 weeks  Dexcom download from 06/02 through 6/15 shows an average glucose of 229 with 27% of values within range and a standard deviation of 71   27% of values are within range  The less than 1% are low  Blood sugars are best typically between 9:00 a m  and 6:00 p m     They then run higher and stay elevated overnight

## 2022-06-16 DIAGNOSIS — E10.9 TYPE 1 DIABETES MELLITUS WITHOUT COMPLICATION (HCC): Primary | ICD-10-CM

## 2022-06-16 RX ORDER — INSULIN PMP CART,AUT,G6/7,CNTR
EACH SUBCUTANEOUS
Qty: 10 EACH | Refills: 5 | Status: SHIPPED | OUTPATIENT
Start: 2022-06-16 | End: 2022-07-08 | Stop reason: SDUPTHER

## 2022-06-16 RX ORDER — INSULIN PMP CART,AUT,G6/7,CNTR
EACH SUBCUTANEOUS
Qty: 1 KIT | Refills: 0 | Status: SHIPPED | OUTPATIENT
Start: 2022-06-16 | End: 2022-07-08 | Stop reason: SDUPTHER

## 2022-07-08 DIAGNOSIS — E10.9 TYPE 1 DIABETES MELLITUS WITHOUT COMPLICATION (HCC): ICD-10-CM

## 2022-07-08 RX ORDER — INSULIN PMP CART,AUT,G6/7,CNTR
EACH SUBCUTANEOUS
Qty: 10 EACH | Refills: 5 | Status: SHIPPED | OUTPATIENT
Start: 2022-07-08

## 2022-07-08 RX ORDER — INSULIN PMP CART,AUT,G6/7,CNTR
EACH SUBCUTANEOUS
Qty: 1 KIT | Refills: 0 | Status: SHIPPED | OUTPATIENT
Start: 2022-07-08

## 2022-07-08 NOTE — TELEPHONE ENCOUNTER
Patient's mother left voicemail stating the Omnipod 5 was approved by insurance and [facility] needs prescriptions

## 2022-07-13 DIAGNOSIS — E10.9 TYPE 1 DIABETES MELLITUS WITHOUT COMPLICATION (HCC): Primary | ICD-10-CM

## 2022-07-13 DIAGNOSIS — R73.9 HYPERGLYCEMIA: ICD-10-CM

## 2022-07-13 DIAGNOSIS — E55.9 VITAMIN D DEFICIENCY: ICD-10-CM

## 2022-07-30 LAB
25(OH)D3+25(OH)D2 SERPL-MCNC: 18.5 NG/ML (ref 30–100)
ALBUMIN SERPL-MCNC: 4.4 G/DL (ref 4.1–5.2)
ALBUMIN/GLOB SERPL: 2 {RATIO} (ref 1.2–2.2)
ALP SERPL-CCNC: 91 IU/L (ref 44–121)
ALT SERPL-CCNC: 14 IU/L (ref 0–44)
AST SERPL-CCNC: 10 IU/L (ref 0–40)
BILIRUB SERPL-MCNC: 0.4 MG/DL (ref 0–1.2)
BUN SERPL-MCNC: 10 MG/DL (ref 6–20)
BUN/CREAT SERPL: 11 (ref 9–20)
CALCIUM SERPL-MCNC: 9.1 MG/DL (ref 8.7–10.2)
CHLORIDE SERPL-SCNC: 102 MMOL/L (ref 96–106)
CO2 SERPL-SCNC: 24 MMOL/L (ref 20–29)
CREAT SERPL-MCNC: 0.95 MG/DL (ref 0.76–1.27)
EGFR: 117 ML/MIN/1.73
GLOBULIN SER-MCNC: 2.2 G/DL (ref 1.5–4.5)
GLUCOSE SERPL-MCNC: 116 MG/DL (ref 65–99)
HBA1C MFR BLD: 6.6 % (ref 4.8–5.6)
POTASSIUM SERPL-SCNC: 3.7 MMOL/L (ref 3.5–5.2)
PROT SERPL-MCNC: 6.6 G/DL (ref 6–8.5)
SODIUM SERPL-SCNC: 141 MMOL/L (ref 134–144)
TSH SERPL DL<=0.005 MIU/L-ACNC: 1.21 UIU/ML (ref 0.45–4.5)

## 2022-08-03 ENCOUNTER — OFFICE VISIT (OUTPATIENT)
Dept: ENDOCRINOLOGY | Facility: HOSPITAL | Age: 22
End: 2022-08-03
Payer: COMMERCIAL

## 2022-08-03 VITALS
SYSTOLIC BLOOD PRESSURE: 110 MMHG | HEIGHT: 72 IN | HEART RATE: 109 BPM | DIASTOLIC BLOOD PRESSURE: 80 MMHG | WEIGHT: 179 LBS | BODY MASS INDEX: 24.24 KG/M2

## 2022-08-03 DIAGNOSIS — R73.9 HYPERGLYCEMIA: ICD-10-CM

## 2022-08-03 DIAGNOSIS — E55.9 VITAMIN D DEFICIENCY: ICD-10-CM

## 2022-08-03 DIAGNOSIS — E10.9 TYPE 1 DIABETES MELLITUS WITHOUT COMPLICATION (HCC): Primary | ICD-10-CM

## 2022-08-03 PROCEDURE — 99214 OFFICE O/P EST MOD 30 MIN: CPT | Performed by: NURSE PRACTITIONER

## 2022-08-03 PROCEDURE — 95251 CONT GLUC MNTR ANALYSIS I&R: CPT | Performed by: NURSE PRACTITIONER

## 2022-08-03 NOTE — PATIENT INSTRUCTIONS
Be mindful of diet  Stay active and stay hydrated  For now, continue Basaglar at current dose  Please make slight change to I:C ratio to 1:9 at meals  Continue to utilize the Dexcom continuous glucose monitor  Start Omnipod when after training  Take 5000 units of Vitamin D 3 daily  [place], [place] : Dr Morris Hunter

## 2022-08-03 NOTE — PROGRESS NOTES
Mary Mccormack 24 y o  male MRN: [id number]    Encounter: [id number]      Assessment/Plan     Assessment: This is a 24y o -year-old male with type 1 diabetes with hyperglycemia and vitamin-D deficiency  Plan:  1  Type 1 diabetes:  His most recent hemoglobin A1c is 6 6  Review of his Dexcom download reveals some hyperglycemia overnight and after dinner as he has been having some sleep disturbances and up eating during the night  For this, I have asked him to adjust his insulin to carbohydrate ratio to 1:9 and continue Basaglar at current dose  He will be starting an Omnipod 5 on with his Dexcom and is receiving Pump Training today  Reviewed recognition and proper treatment of hypoglycemic episodes  He will contact the office with any problems  Check hemoglobin A1c and comprehensive metabolic panel prior to next visit  2   Vitamin-D deficiency:  Most recent level is 18 5  I have asked him to restart consistent supplementation with 5000 units of vitamin D3 daily  Will continue to monitor level  CC:  Type 1 Diabetes follow-up    History of Present Illness     HPI:  24y o  year old male with type 1 diabetes for approximately 1 year  He is on insulin at home and takes Basaglar 16 units daily and Humalog with an insulin to carbohydrate ratio at meals of 1:10  Most recent hemoglobin A1c from July 29, 2022 is 6 6  He denies any polyuria, polydipsia, nocturia and blurry vision  He denies neuropathy, nephropathy and retinopathy        Hypoglycemic episodes: No, rare  Does have intranasal glucagon in case of severe hypoglycemia      Blood Sugar/Glucometer/Pump/CGM review:  Dex com download from July 21 through August 3, 2022 shows an average glucose of 178 with a standard deviation of 54   he is in target range 57% of the time with 30% high blood sugars and 12% very high  There is less than 1% low blood sugar on this report    He has been experiencing some anxiety at night and has not been sleeping well the us he is up eating  Most recent 25 hydroxy vitamin-D value from July 29, 2022 is 18 5  He is not currently supplementing with any vitamin-D as he ran out approximately 4 weeks ago  Review of Systems   Constitutional: Negative  Negative for chills, fatigue and fever  HENT: Negative  Negative for trouble swallowing and voice change  Eyes: Negative for photophobia, pain, discharge, redness, itching and visual disturbance  Respiratory: Negative for cough and shortness of breath  Cardiovascular: Negative for chest pain and palpitations  Gastrointestinal: Negative for abdominal pain, constipation, diarrhea, nausea and vomiting  Endocrine: Negative for cold intolerance, heat intolerance, polydipsia, polyphagia and polyuria  Genitourinary: Negative  Musculoskeletal: Negative  Skin: Negative  Allergic/Immunologic: Negative  Neurological: Negative for dizziness, syncope, light-headedness and headaches  Hematological: Negative  Psychiatric/Behavioral: The patient is nervous/anxious  All other systems reviewed and are negative  Historical Information   No past medical history on file  No past surgical history on file    Social History   Social History     Substance and Sexual Activity   Alcohol Use Not Currently    Comment: some beer with dinner      Social History     Substance and Sexual Activity   Drug Use Never     Social History     Tobacco Use   Smoking Status Never Smoker   Smokeless Tobacco Never Used     Family History:   Family History   Problem Relation Age of Onset   Saniya Roujed Migraines Mother     Breast cancer Mother     Heart disease Father     Addiction problem Father     Diabetes unspecified Father     Pancreatitis Father     No Known Problems Sister     Heart disease Maternal Grandmother     Heart disease Maternal Grandfather     Prostate cancer Maternal Grandfather        Meds/Allergies   Current Outpatient Medications   Medication Sig Dispense Refill    Accu-Chek FastClix Lancets MISC 4 times daily 100 each 3    acetone, urine, test (Ketostix) strip Use prn hyperglycemia 25 each 0    Blood Glucose Monitoring Suppl (Accu-Chek Marquita Plus) w/Device KIT daily 1 kit 0    Cholecalciferol 125 MCG (5000 UT) capsule 1 cap daily 30 capsule 5    Continuous Blood Gluc  (Dexcom G6 ) IRMA As directed 1 each 0    Continuous Blood Gluc Sensor (Dexcom G6 Sensor) MISC q 10 days 9 each 3    Continuous Blood Gluc Transmit (Dexcom G6 Transmitter) MISC q 90 days 1 each 3    Glucagon (Baqsimi One Pack) 3 MG/DOSE POWD 3 mg prn severe hypoglycemia 1 each 0    glucose blood (Accu-Chek Marquita Plus) test strip 4 times daily 100 each 5    Insulin Disposable Pump (Omnipod 5 G6 Intro, Gen 5,) KIT Change pods q3 days 1 kit 0    Insulin Disposable Pump (Omnipod 5 G6 Pod, Gen 5,) MISC Change pods q 3 days 10 each 5    insulin glargine (Basaglar KwikPen) 100 units/mL injection pen 18 units daily 15 mL 2    insulin lispro (HumaLOG KwikPen) 100 units/mL injection pen Up to 25 units daily 15 mL 5    Insulin Pen Needle (BD Pen Needle Ibis U/F) 32G X 4 MM MISC 4 times daily 400 each 3    multivitamin (THERAGRAN) TABS Take 1 tablet by mouth if needed      dexmethylphenidate (FOCALIN XR) 10 MG 24 hr capsule Take 10 mg by mouth daily (Patient not taking: Reported on 1/14/2022 )      FLUoxetine (PROzac) 40 MG capsule Take 80 mg by mouth daily (Patient not taking: Reported on 8/3/2022)      hydrOXYzine pamoate (VISTARIL) 25 mg capsule Take 25 mg by mouth if needed for itching Up to 150MG a day      traZODone (DESYREL) 50 mg tablet 150 mg   (Patient not taking: Reported on 8/3/2022)       No current facility-administered medications for this visit  Allergies   Allergen Reactions    Bupropion Rash       Objective   Vitals: Blood pressure 110/80, pulse (!) 109, height 6' (1 829 m), weight 81 2 kg (179 lb)  Physical Exam  Vitals reviewed  Constitutional:       Appearance: He is well-developed  HENT:      Head: Normocephalic and atraumatic  Nose: Nose normal    Eyes:      Conjunctiva/sclera: Conjunctivae normal       Pupils: Pupils are equal, round, and reactive to light  Cardiovascular:      Rate and Rhythm: Normal rate and regular rhythm  Heart sounds: Normal heart sounds  Pulmonary:      Effort: Pulmonary effort is normal       Breath sounds: Normal breath sounds  Abdominal:      General: Bowel sounds are normal       Palpations: Abdomen is soft  Musculoskeletal:         General: Normal range of motion  Cervical back: Normal range of motion and neck supple  Skin:     General: Skin is warm and dry  Neurological:      Mental Status: He is alert and oriented to person, place, and time  Psychiatric:         Behavior: Behavior normal          Thought Content:  Thought content normal          Judgment: Judgment normal        Lab Results:   Lab Results   Component Value Date/Time    Hemoglobin A1C 6 6 (H) 07/29/2022 09:13 AM    Hemoglobin A1C 5 7 (H) 04/11/2022 07:31 AM    Hemoglobin A1C 6 1 (H) 01/05/2022 10:21 AM    White Blood Cell Count 4 6 04/11/2022 07:31 AM    White Blood Cell Count 6 1 01/05/2022 10:21 AM    White Blood Cell Count 4 8 11/12/2021 09:52 AM    Hemoglobin 15 8 04/11/2022 07:31 AM    Hemoglobin 15 7 01/05/2022 10:21 AM    Hemoglobin 15 2 11/12/2021 09:52 AM    HCT 46 0 04/11/2022 07:31 AM    HCT 46 4 01/05/2022 10:21 AM    HCT 44 7 11/12/2021 09:52 AM    MCV 84 04/11/2022 07:31 AM    MCV 84 01/05/2022 10:21 AM    MCV 85 11/12/2021 09:52 AM    Platelet Count 449 84/20/5494 07:31 AM    Platelet Count 216 52/68/1360 10:21 AM    Platelet Count 622 20/69/6117 09:52 AM    BUN 10 07/29/2022 09:13 AM    BUN 18 05/04/2022 09:01 AM    BUN 17 04/11/2022 07:31 AM    Potassium 3 7 07/29/2022 09:13 AM    Potassium 4 9 05/04/2022 09:01 AM    Potassium 4 3 04/11/2022 07:31 AM    Chloride 102 07/29/2022 09:13 AM Chloride 98 05/04/2022 09:01 AM    Chloride 101 04/11/2022 07:31 AM    CO2 24 07/29/2022 09:13 AM    CO2 25 05/04/2022 09:01 AM    CO2 24 04/11/2022 07:31 AM    Creatinine 0 95 07/29/2022 09:13 AM    Creatinine 1 01 05/04/2022 09:01 AM    Creatinine 1 22 04/11/2022 07:31 AM    AST 10 07/29/2022 09:13 AM    AST 11 05/04/2022 09:01 AM    AST 11 04/11/2022 07:31 AM    ALT 14 07/29/2022 09:13 AM    ALT 19 05/04/2022 09:01 AM    ALT 13 04/11/2022 07:31 AM    Albumin 4 4 07/29/2022 09:13 AM    Albumin 4 5 05/04/2022 09:01 AM    Albumin 4 6 04/11/2022 07:31 AM    Globulin, Total 2 2 07/29/2022 09:13 AM    Globulin, Total 2 3 05/04/2022 09:01 AM    Globulin, Total 2 3 04/11/2022 07:31 AM    HDL 29 (L) 04/11/2022 07:31 AM    HDL 40 01/05/2022 10:21 AM    Triglycerides 233 (H) 04/11/2022 07:31 AM    Triglycerides 164 (H) 01/05/2022 10:21 AM     Portions of the record may have been created with voice recognition software  Occasional wrong word or "sound a like" substitutions may have occurred due to the inherent limitations of voice recognition software  Read the chart carefully and recognize, using context, where substitutions have occurred

## 2022-08-04 ENCOUNTER — TELEPHONE (OUTPATIENT)
Dept: DIABETES SERVICES | Facility: HOSPITAL | Age: 22
End: 2022-08-04

## 2022-08-04 NOTE — TELEPHONE ENCOUNTER
Vanessa Machado met with the insulin pump  from [address] after his visit with endocrinology  Transition from basal bolus insulin to pump  Basal rate over 24 hours of 0 65  Target glucose 110-120  Insulin to carb ratio 9, correction factor 50, duration of insulin 4 hours  These are starting rates based on current insulin doses  Pump therapy order form scanned in to chart

## 2022-08-08 DIAGNOSIS — E10.9 TYPE 1 DIABETES MELLITUS WITHOUT COMPLICATION (HCC): Primary | ICD-10-CM

## 2022-09-07 ENCOUNTER — TELEPHONE (OUTPATIENT)
Dept: ENDOCRINOLOGY | Facility: HOSPITAL | Age: 22
End: 2022-09-07

## 2022-09-07 NOTE — TELEPHONE ENCOUNTER
Mom said he was in [facility] from Sunday to Monday because his Ketones were high  Blood sugar has been between 100-150  He's not eating much either  She said he usually avoids carbs so she is wondering if he should try to eat carbs since he isn't eating much  Mom is going to have them fax us the records  Mom wants to know what can be done differently with his Diabetes and especially when he is sick  I downloaded his Dexcom report and had it scanned in

## 2022-09-07 NOTE — TELEPHONE ENCOUNTER
His Dexcom download actually looks very well controlled so I do not think any insulin adjustments are needed  He tends can be high if blood sugars are normal for other reasons other than diabetes  In this case it may be best to make sure he is eating carbohydrates consistently and adjusting insulin regimen after that  This can be discussed further at next appointment or with diabetes education earlier to encourage carb intake as well as what types and amounts of carbs

## 2022-10-21 LAB
ALBUMIN SERPL-MCNC: 4.7 G/DL (ref 4.1–5.2)
ALBUMIN/GLOB SERPL: 2.1 {RATIO} (ref 1.2–2.2)
ALP SERPL-CCNC: 92 IU/L (ref 44–121)
ALT SERPL-CCNC: 17 IU/L (ref 0–44)
AST SERPL-CCNC: 13 IU/L (ref 0–40)
BASOPHILS # BLD AUTO: 0 X10E3/UL (ref 0–0.2)
BASOPHILS NFR BLD AUTO: 1 %
BILIRUB SERPL-MCNC: 0.4 MG/DL (ref 0–1.2)
BUN SERPL-MCNC: 14 MG/DL (ref 6–20)
BUN/CREAT SERPL: 14 (ref 9–20)
CALCIUM SERPL-MCNC: 9.4 MG/DL (ref 8.7–10.2)
CHLORIDE SERPL-SCNC: 102 MMOL/L (ref 96–106)
CHOLEST SERPL-MCNC: 168 MG/DL (ref 100–199)
CO2 SERPL-SCNC: 27 MMOL/L (ref 20–29)
CREAT SERPL-MCNC: 0.98 MG/DL (ref 0.76–1.27)
EGFR: 112 ML/MIN/1.73
EOSINOPHIL # BLD AUTO: 0 X10E3/UL (ref 0–0.4)
EOSINOPHIL NFR BLD AUTO: 1 %
ERYTHROCYTE [DISTWIDTH] IN BLOOD BY AUTOMATED COUNT: 12.9 % (ref 11.6–15.4)
EST. AVERAGE GLUCOSE BLD GHB EST-MCNC: 111 MG/DL
GLOBULIN SER-MCNC: 2.2 G/DL (ref 1.5–4.5)
GLUCOSE SERPL-MCNC: 85 MG/DL (ref 70–99)
HBA1C MFR BLD: 5.5 % (ref 4.8–5.6)
HCT VFR BLD AUTO: 45.3 % (ref 37.5–51)
HDLC SERPL-MCNC: 36 MG/DL
HGB BLD-MCNC: 15.7 G/DL (ref 13–17.7)
IMM GRANULOCYTES # BLD: 0 X10E3/UL (ref 0–0.1)
IMM GRANULOCYTES NFR BLD: 1 %
LDLC SERPL CALC-MCNC: 121 MG/DL (ref 0–99)
LYMPHOCYTES # BLD AUTO: 2 X10E3/UL (ref 0.7–3.1)
LYMPHOCYTES NFR BLD AUTO: 46 %
MCH RBC QN AUTO: 29 PG (ref 26.6–33)
MCHC RBC AUTO-ENTMCNC: 34.7 G/DL (ref 31.5–35.7)
MCV RBC AUTO: 84 FL (ref 79–97)
MONOCYTES # BLD AUTO: 0.4 X10E3/UL (ref 0.1–0.9)
MONOCYTES NFR BLD AUTO: 8 %
NEUTROPHILS # BLD AUTO: 1.9 X10E3/UL (ref 1.4–7)
NEUTROPHILS NFR BLD AUTO: 43 %
PLATELET # BLD AUTO: 196 X10E3/UL (ref 150–450)
POTASSIUM SERPL-SCNC: 4.5 MMOL/L (ref 3.5–5.2)
PROT SERPL-MCNC: 6.9 G/DL (ref 6–8.5)
RBC # BLD AUTO: 5.41 X10E6/UL (ref 4.14–5.8)
SL AMB VLDL CHOLESTEROL CALC: 11 MG/DL (ref 5–40)
SODIUM SERPL-SCNC: 142 MMOL/L (ref 134–144)
TRIGL SERPL-MCNC: 56 MG/DL (ref 0–149)
WBC # BLD AUTO: 4.3 X10E3/UL (ref 3.4–10.8)

## 2022-11-04 ENCOUNTER — OFFICE VISIT (OUTPATIENT)
Dept: ENDOCRINOLOGY | Facility: HOSPITAL | Age: 22
End: 2022-11-04

## 2022-11-04 VITALS
HEART RATE: 88 BPM | HEIGHT: 72 IN | SYSTOLIC BLOOD PRESSURE: 122 MMHG | DIASTOLIC BLOOD PRESSURE: 84 MMHG | WEIGHT: 169.6 LBS | BODY MASS INDEX: 22.97 KG/M2

## 2022-11-04 DIAGNOSIS — R73.9 HYPERGLYCEMIA: ICD-10-CM

## 2022-11-04 DIAGNOSIS — E10.9 TYPE 1 DIABETES MELLITUS WITHOUT COMPLICATION (HCC): Primary | ICD-10-CM

## 2022-11-04 DIAGNOSIS — E55.9 VITAMIN D DEFICIENCY: ICD-10-CM

## 2022-11-04 RX ORDER — BLOOD-GLUCOSE METER
KIT MISCELLANEOUS
Qty: 200 STRIP | Refills: 6 | Status: SHIPPED | OUTPATIENT
Start: 2022-11-04

## 2022-11-04 RX ORDER — GLUCAGON 3 MG/1
POWDER NASAL
Qty: 2 EACH | Refills: 1 | Status: SHIPPED | OUTPATIENT
Start: 2022-11-04

## 2022-11-04 RX ORDER — BLOOD-GLUCOSE METER
KIT MISCELLANEOUS
Qty: 1 KIT | Refills: 0 | Status: SHIPPED | OUTPATIENT
Start: 2022-11-04

## 2022-11-04 RX ORDER — ESCITALOPRAM OXALATE 5 MG/1
10 TABLET ORAL DAILY
COMMUNITY
Start: 2022-10-26

## 2022-11-04 NOTE — PROGRESS NOTES
Triston Gaitan 25 y o  male MRN: [id number]    Encounter: [id number]      Assessment/Plan     Assessment: This is a 25y o -year-old male with type 1 diabetes with hyperglycemia and vitamin-D deficiency  Plan:  1  Type 1 diabetes:  His most recent hemoglobin A1c is 5 5  Review of his Dexcom continuous glucose monitor and his Omnipod 5 pump download, he is relatively well controlled  For now, we will continue all settings as they are currently  Reviewed recognition and proper treatment of hypoglycemic episodes  He will contact the office with any problems  Check hemoglobin A1c and comprehensive metabolic panel prior to next visit      2   Vitamin-D deficiency:  Most recent level is 18 5  I have asked him to restart consistent supplementation with 5000 units of vitamin D3 daily  Will continue to monitor level  CC:  Type 1 Diabetes follow-up    History of Present Illness     HPI:  25 y  o  year old male with type 1 diabetes for approximately 1 year   He is on insulin and Omnipod 5 with a Dexcom continuous glucose monitor  His pump settings are as follows:  Basal  Midnight - 0 65  Insulin to carbohydrate ratio  Midnight - 1:9  Sensitivity - 50  Blood glucose target range - 110    Most recent hemoglobin A1c from October 20, 2022 is 5 5  He denies any polyuria, polydipsia, nocturia and blurry vision  He denies neuropathy, nephropathy and retinopathy        Hypoglycemic episodes: No, rare  Does have intranasal glucagon in case of severe hypoglycemia      Blood Sugar/Glucometer/Pump/CGM review:  Dex com download from October 22 through November 4, 2022 shows an average glucose of 147 with a standard deviation of 48   he is in target range 78% of the time with 21% high blood sugars and <1% very high  There is less than 1% low blood sugar on this report    he is doing exceptionally well with an in target range of 80% and less than 1% low blood sugar      Most recent 25 hydroxy vitamin-D value from July 29, 2022 is 18 5  He is not currently supplementing with any vitamin-D as he ran out approximately 4 weeks ago  Review of Systems   Constitutional: Negative  Negative for chills, fatigue and fever  HENT: Negative  Negative for trouble swallowing and voice change  Eyes: Negative for photophobia, pain, discharge, redness, itching and visual disturbance  Respiratory: Negative for cough and shortness of breath  Cardiovascular: Negative for chest pain and palpitations  Gastrointestinal: Negative for abdominal pain, constipation, diarrhea, nausea and vomiting  Endocrine: Negative for cold intolerance, heat intolerance, polydipsia, polyphagia and polyuria  Genitourinary: Negative  Musculoskeletal: Negative  Skin: Negative  Allergic/Immunologic: Negative  Neurological: Negative for dizziness, syncope, light-headedness and headaches  Hematological: Negative  Psychiatric/Behavioral: The patient is nervous/anxious  All other systems reviewed and are negative  Historical Information   History reviewed  No pertinent past medical history  History reviewed  No pertinent surgical history    Social History   Social History     Substance and Sexual Activity   Alcohol Use Not Currently   • Alcohol/week: 2 0 standard drinks   • Types: 2 Cans of beer per week    Comment: some beer with dinner      Social History     Substance and Sexual Activity   Drug Use Never     Social History     Tobacco Use   Smoking Status Never Smoker   Smokeless Tobacco Never Used     Family History:   Family History   Problem Relation Age of Onset   • Migraines Mother    • Breast cancer Mother    • Heart disease Father    • Addiction problem Father    • Diabetes unspecified Father    • Pancreatitis Father    • No Known Problems Sister    • Heart disease Maternal Grandmother    • Heart disease Maternal Grandfather    • Prostate cancer Maternal Grandfather        Meds/Allergies   Current Outpatient Medications Medication Sig Dispense Refill   • Cholecalciferol 125 MCG (5000 UT) capsule 1 cap daily 30 capsule 5   • Continuous Blood Gluc  (Dexcom G6 ) IRMA As directed 1 each 0   • Continuous Blood Gluc Sensor (Dexcom G6 Sensor) MISC q 10 days 9 each 3   • Continuous Blood Gluc Transmit (Dexcom G6 Transmitter) MISC q 90 days 1 each 3   • escitalopram (LEXAPRO) 5 mg tablet Take 10 mg by mouth in the morning     • Glucagon (Baqsimi One Pack) 3 MG/DOSE POWD 3 mg prn severe hypoglycemia 1 each 0   • Insulin Disposable Pump (Omnipod 5 G6 Intro, Gen 5,) KIT Change pods q3 days 1 kit 0   • Insulin Disposable Pump (Omnipod 5 G6 Pod, Gen 5,) MISC Change pods q 3 days 10 each 5   • insulin lispro (HumaLOG) 100 units/mL injection Use up to 50 units daily via insulin pump 20 mL 5   • multivitamin (THERAGRAN) TABS Take 1 tablet by mouth if needed     • Accu-Chek FastClix Lancets MISC 4 times daily (Patient not taking: Reported on 11/4/2022) 100 each 3   • acetone, urine, test (Ketostix) strip Use prn hyperglycemia (Patient not taking: Reported on 11/4/2022) 25 each 0   • Blood Glucose Monitoring Suppl (Accu-Chek Marquita Plus) w/Device KIT daily (Patient not taking: Reported on 11/4/2022) 1 kit 0   • FLUoxetine (PROzac) 40 MG capsule Take 80 mg by mouth daily (Patient not taking: No sig reported)     • glucose blood (Accu-Chek Marquita Plus) test strip 4 times daily (Patient not taking: Reported on 11/4/2022) 100 each 5   • insulin glargine (Basaglar KwikPen) 100 units/mL injection pen 18 units daily (Patient not taking: No sig reported) 15 mL 2   • Insulin Pen Needle (BD Pen Needle Ibis U/F) 32G X 4 MM MISC 4 times daily (Patient not taking: No sig reported) 400 each 3   • traZODone (DESYREL) 50 mg tablet 150 mg   (Patient not taking: No sig reported)       No current facility-administered medications for this visit       Allergies   Allergen Reactions   • Bupropion Rash       Objective   Vitals: Blood pressure 122/84, pulse 88, height 6' (1 829 m), weight 76 9 kg (169 lb 9 6 oz)  Physical Exam  Vitals reviewed  Constitutional:       Appearance: He is well-developed  HENT:      Head: Normocephalic and atraumatic  Nose: Nose normal    Eyes:      Conjunctiva/sclera: Conjunctivae normal       Pupils: Pupils are equal, round, and reactive to light  Cardiovascular:      Rate and Rhythm: Normal rate and regular rhythm  Heart sounds: Normal heart sounds  Pulmonary:      Effort: Pulmonary effort is normal       Breath sounds: Normal breath sounds  Abdominal:      General: Bowel sounds are normal       Palpations: Abdomen is soft  Musculoskeletal:         General: Normal range of motion  Cervical back: Normal range of motion and neck supple  Skin:     General: Skin is warm and dry  Neurological:      Mental Status: He is alert and oriented to person, place, and time  Psychiatric:         Behavior: Behavior normal          Thought Content:  Thought content normal          Judgment: Judgment normal        Lab Results:   Lab Results   Component Value Date/Time    Hemoglobin A1C 5 5 10/20/2022 08:31 AM    Hemoglobin A1C 6 6 (H) 07/29/2022 09:13 AM    Hemoglobin A1C 5 7 (H) 04/11/2022 07:31 AM    White Blood Cell Count 4 3 10/20/2022 08:31 AM    White Blood Cell Count 4 6 04/11/2022 07:31 AM    White Blood Cell Count 6 1 01/05/2022 10:21 AM    Hemoglobin 15 7 10/20/2022 08:31 AM    Hemoglobin 15 8 04/11/2022 07:31 AM    Hemoglobin 15 7 01/05/2022 10:21 AM    HCT 45 3 10/20/2022 08:31 AM    HCT 46 0 04/11/2022 07:31 AM    HCT 46 4 01/05/2022 10:21 AM    MCV 84 10/20/2022 08:31 AM    MCV 84 04/11/2022 07:31 AM    MCV 84 01/05/2022 10:21 AM    Platelet Count 091 52/42/6423 08:31 AM    Platelet Count 149 74/52/5555 07:31 AM    Platelet Count 096 70/41/2613 10:21 AM    BUN 14 10/20/2022 08:31 AM    BUN 10 07/29/2022 09:13 AM    BUN 18 05/04/2022 09:01 AM    Potassium 4 5 10/20/2022 08:31 AM    Potassium 3 7 07/29/2022 09:13 AM    Potassium 4 9 05/04/2022 09:01 AM    Chloride 102 10/20/2022 08:31 AM    Chloride 102 07/29/2022 09:13 AM    Chloride 98 05/04/2022 09:01 AM    CO2 27 10/20/2022 08:31 AM    CO2 24 07/29/2022 09:13 AM    CO2 25 05/04/2022 09:01 AM    Creatinine 0 98 10/20/2022 08:31 AM    Creatinine 0 95 07/29/2022 09:13 AM    Creatinine 1 01 05/04/2022 09:01 AM    AST 13 10/20/2022 08:31 AM    AST 10 07/29/2022 09:13 AM    AST 11 05/04/2022 09:01 AM    ALT 17 10/20/2022 08:31 AM    ALT 14 07/29/2022 09:13 AM    ALT 19 05/04/2022 09:01 AM    Albumin 4 7 10/20/2022 08:31 AM    Albumin 4 4 07/29/2022 09:13 AM    Albumin 4 5 05/04/2022 09:01 AM    Globulin, Total 2 2 10/20/2022 08:31 AM    Globulin, Total 2 2 07/29/2022 09:13 AM    Globulin, Total 2 3 05/04/2022 09:01 AM    HDL 36 (L) 10/20/2022 08:31 AM    HDL 29 (L) 04/11/2022 07:31 AM    HDL 40 01/05/2022 10:21 AM    Triglycerides 56 10/20/2022 08:31 AM    Triglycerides 233 (H) 04/11/2022 07:31 AM    Triglycerides 164 (H) 01/05/2022 10:21 AM     Portions of the record may have been created with voice recognition software  Occasional wrong word or "sound a like" substitutions may have occurred due to the inherent limitations of voice recognition software  Read the chart carefully and recognize, using context, where substitutions have occurred

## 2022-11-04 NOTE — PATIENT INSTRUCTIONS
Be mindful of diet  Stay active and stay hydrated  For now, continue Basaglar at current dose  Please make slight change to I:C ratio to 1:9 at meals  Continue to utilize the Dexcom continuous glucose monitor  Start Omnipod when after training  Take 5000 units of Vitamin D 3 daily

## 2023-01-13 DIAGNOSIS — E10.9 TYPE 1 DIABETES MELLITUS WITHOUT COMPLICATION (HCC): ICD-10-CM

## 2023-01-13 RX ORDER — INSULIN LISPRO 100 [IU]/ML
INJECTION, SOLUTION INTRAVENOUS; SUBCUTANEOUS
Qty: 20 ML | Refills: 0 | Status: SHIPPED | OUTPATIENT
Start: 2023-01-13

## 2023-02-01 DIAGNOSIS — E55.9 VITAMIN D DEFICIENCY: ICD-10-CM

## 2023-02-01 RX ORDER — CHOLECALCIFEROL (VITAMIN D3) 125 MCG
CAPSULE ORAL
Qty: 30 CAPSULE | Refills: 5 | Status: SHIPPED | OUTPATIENT
Start: 2023-02-01

## 2023-02-04 LAB
25(OH)D3+25(OH)D2 SERPL-MCNC: 39 NG/ML (ref 30–100)
ALBUMIN SERPL-MCNC: 4.7 G/DL (ref 4.1–5.2)
ALBUMIN/GLOB SERPL: 2 {RATIO} (ref 1.2–2.2)
ALP SERPL-CCNC: 91 IU/L (ref 44–121)
ALT SERPL-CCNC: 14 IU/L (ref 0–44)
AST SERPL-CCNC: 15 IU/L (ref 0–40)
BASOPHILS # BLD AUTO: 0 X10E3/UL (ref 0–0.2)
BASOPHILS NFR BLD AUTO: 1 %
BILIRUB SERPL-MCNC: 0.6 MG/DL (ref 0–1.2)
BUN SERPL-MCNC: 17 MG/DL (ref 6–20)
BUN/CREAT SERPL: 15 (ref 9–20)
CALCIUM SERPL-MCNC: 9.7 MG/DL (ref 8.7–10.2)
CHLORIDE SERPL-SCNC: 98 MMOL/L (ref 96–106)
CO2 SERPL-SCNC: 23 MMOL/L (ref 20–29)
CREAT SERPL-MCNC: 1.13 MG/DL (ref 0.76–1.27)
EGFR: 94 ML/MIN/1.73
EOSINOPHIL # BLD AUTO: 0.1 X10E3/UL (ref 0–0.4)
EOSINOPHIL NFR BLD AUTO: 2 %
ERYTHROCYTE [DISTWIDTH] IN BLOOD BY AUTOMATED COUNT: 12.4 % (ref 11.6–15.4)
EST. AVERAGE GLUCOSE BLD GHB EST-MCNC: 111 MG/DL
GLOBULIN SER-MCNC: 2.4 G/DL (ref 1.5–4.5)
GLUCOSE SERPL-MCNC: 118 MG/DL (ref 70–99)
HBA1C MFR BLD: 5.5 % (ref 4.8–5.6)
HCT VFR BLD AUTO: 46.7 % (ref 37.5–51)
HGB BLD-MCNC: 16.7 G/DL (ref 13–17.7)
IMM GRANULOCYTES # BLD: 0 X10E3/UL (ref 0–0.1)
IMM GRANULOCYTES NFR BLD: 0 %
LYMPHOCYTES # BLD AUTO: 2.5 X10E3/UL (ref 0.7–3.1)
LYMPHOCYTES NFR BLD AUTO: 49 %
MCH RBC QN AUTO: 29.1 PG (ref 26.6–33)
MCHC RBC AUTO-ENTMCNC: 35.8 G/DL (ref 31.5–35.7)
MCV RBC AUTO: 82 FL (ref 79–97)
MONOCYTES # BLD AUTO: 0.6 X10E3/UL (ref 0.1–0.9)
MONOCYTES NFR BLD AUTO: 11 %
NEUTROPHILS # BLD AUTO: 1.8 X10E3/UL (ref 1.4–7)
NEUTROPHILS NFR BLD AUTO: 37 %
PLATELET # BLD AUTO: 214 X10E3/UL (ref 150–450)
POTASSIUM SERPL-SCNC: 4.5 MMOL/L (ref 3.5–5.2)
PROT SERPL-MCNC: 7.1 G/DL (ref 6–8.5)
RBC # BLD AUTO: 5.73 X10E6/UL (ref 4.14–5.8)
SODIUM SERPL-SCNC: 138 MMOL/L (ref 134–144)
TSH SERPL DL<=0.005 MIU/L-ACNC: 1.8 UIU/ML (ref 0.45–4.5)
WBC # BLD AUTO: 5 X10E3/UL (ref 3.4–10.8)

## 2023-02-10 ENCOUNTER — OFFICE VISIT (OUTPATIENT)
Dept: ENDOCRINOLOGY | Facility: HOSPITAL | Age: 23
End: 2023-02-10

## 2023-02-10 VITALS
SYSTOLIC BLOOD PRESSURE: 116 MMHG | BODY MASS INDEX: 24.65 KG/M2 | DIASTOLIC BLOOD PRESSURE: 78 MMHG | HEIGHT: 72 IN | WEIGHT: 182 LBS | HEART RATE: 103 BPM

## 2023-02-10 DIAGNOSIS — E10.9 TYPE 1 DIABETES MELLITUS WITHOUT COMPLICATION (HCC): Primary | ICD-10-CM

## 2023-02-10 DIAGNOSIS — E55.9 VITAMIN D DEFICIENCY: ICD-10-CM

## 2023-02-10 DIAGNOSIS — R73.9 HYPERGLYCEMIA: ICD-10-CM

## 2023-02-10 RX ORDER — DEXTROAMPHETAMINE SACCHARATE, AMPHETAMINE ASPARTATE MONOHYDRATE, DEXTROAMPHETAMINE SULFATE AND AMPHETAMINE SULFATE 5; 5; 5; 5 MG/1; MG/1; MG/1; MG/1
20 CAPSULE, EXTENDED RELEASE ORAL DAILY
COMMUNITY
Start: 2023-02-05

## 2023-02-10 RX ORDER — MIRTAZAPINE 15 MG/1
15 TABLET, FILM COATED ORAL
COMMUNITY
Start: 2023-02-01

## 2023-02-10 NOTE — PROGRESS NOTES
Trevin Reyes 25 y o  male MRN: [id number]    Encounter: [id number]      Assessment/Plan     Assessment: This is a 25y o -year-old male with type 1 diabetes with hyperglycemia and vitamin-D deficiency  Plan:  1  Type 1 diabetes:  His most recent hemoglobin A1c is 5 5  Review of his Dexcom continuous glucose monitor and his Omnipod 5 pump download, he is relatively well controlled however, basal is consistently suspended by the pump  For now, I have decreased his basal rate to 0 50  His insulin to carbohydrate ratios remain the same  Reviewed recognition and proper treatment of hypoglycemic episodes  Vashti Hardy will contact the office with any problems   Check hemoglobin A1c and comprehensive metabolic panel prior to next visit      2   Vitamin-D deficiency:  Most recent level is 39 0   I have asked him to continue supplementation with 5000 units of vitamin D3 daily   Will continue to monitor level  CC: Type 1 Diabetes follow up    History of Present Illness     HPI:  25 y  o  year old male with type 1 diabetes for approximately 1 year   He is on insulin and Omnipod 5 with a Dexcom continuous glucose monitor       His pump settings are as follows:  Basal  Midnight - 0 65  Insulin to carbohydrate ratio  Midnight - 1:9  Sensitivity - 50  Blood glucose target range - 110     Most recent hemoglobin A1c from February 3, 2022 is 5 5  He denies any polyuria, polydipsia, nocturia and blurry vision   He denies neuropathy, nephropathy and retinopathy        Hypoglycemic episodes: No, rare  Does have intranasal glucagon in case of severe hypoglycemia      Blood Sugar/Glucometer/Pump/CGM review:  Dex com download from January 28 through February 10, 2023 shows an average glucose of 140 with a standard deviation of 46   he is in target range 80% of the time with 19% high blood sugars  There is less than 1% low blood sugar on this report      Most recent 25 hydroxy vitamin-D value from February 3, 2022 is 39 0   He is currently supplementing with 5000 units of vitamin-D  Review of Systems   Constitutional: Negative  Negative for chills, fatigue and fever  HENT: Negative  Negative for trouble swallowing and voice change  Eyes: Negative for photophobia, pain, discharge, redness, itching and visual disturbance  Respiratory: Negative for cough and shortness of breath  Cardiovascular: Negative for chest pain and palpitations  Gastrointestinal: Negative for abdominal pain, constipation, diarrhea, nausea and vomiting  Endocrine: Negative for cold intolerance, heat intolerance, polydipsia, polyphagia and polyuria  Genitourinary: Negative  Musculoskeletal: Negative  Skin: Negative  Allergic/Immunologic: Negative  Neurological: Negative for dizziness, syncope, light-headedness and headaches  Hematological: Negative  Psychiatric/Behavioral: The patient is nervous/anxious  All other systems reviewed and are negative  Historical Information   No past medical history on file  No past surgical history on file    Social History   Social History     Substance and Sexual Activity   Alcohol Use Not Currently   • Alcohol/week: 2 0 standard drinks   • Types: 2 Cans of beer per week    Comment: some beer with dinner      Social History     Substance and Sexual Activity   Drug Use Never     Social History     Tobacco Use   Smoking Status Never   Smokeless Tobacco Never     Family History:   Family History   Problem Relation Age of Onset   • Migraines Mother    • Breast cancer Mother    • Heart disease Father    • Addiction problem Father    • Diabetes unspecified Father    • Pancreatitis Father    • No Known Problems Sister    • Heart disease Maternal Grandmother    • Heart disease Maternal Grandfather    • Prostate cancer Maternal Grandfather        Meds/Allergies   Current Outpatient Medications   Medication Sig Dispense Refill   • amphetamine-dextroamphetamine (ADDERALL XR) 20 MG 24 hr capsule Take 20 mg by mouth daily     • Cholecalciferol (RA Vitamin D-3) 125 MCG (5000 UT) capsule take 1 capsule by mouth once daily 30 capsule 5   • Continuous Blood Gluc  (Dexcom G6 ) IRMA As directed 1 each 0   • Continuous Blood Gluc Sensor (Dexcom G6 Sensor) MISC q 10 days 9 each 3   • Continuous Blood Gluc Transmit (Dexcom G6 Transmitter) MISC q 90 days 1 each 3   • escitalopram (LEXAPRO) 5 mg tablet Take 15 mg by mouth in the morning     • Glucagon (Baqsimi One Pack) 3 MG/DOSE POWD 3 mg prn severe hypoglycemia 2 each 1   • HumaLOG 100 UNIT/ML injection USE UP TO 50 UNITS DAILY VIA INSULIN PUMP 20 mL 0   • Insulin Disposable Pump (Omnipod 5 G6 Intro, Gen 5,) KIT Change pods q3 days 1 kit 0   • Insulin Disposable Pump (Omnipod 5 G6 Pod, Gen 5,) MISC Change pods q 3 days 10 each 5   • mirtazapine (REMERON) 15 mg tablet Take 15 mg by mouth daily at bedtime     • multivitamin (THERAGRAN) TABS Take 1 tablet by mouth if needed     • Accu-Chek FastClix Lancets MISC 4 times daily (Patient not taking: Reported on 11/4/2022) 100 each 3   • acetone, urine, test (Ketostix) strip Use prn hyperglycemia (Patient not taking: Reported on 11/4/2022) 25 each 0   • Blood Glucose Monitoring Suppl (FreeStyle Lite) w/Device KIT Check blood sugars 4 times daily (Patient not taking: Reported on 2/10/2023) 1 kit 0   • FLUoxetine (PROzac) 40 MG capsule Take 80 mg by mouth daily (Patient not taking: Reported on 8/3/2022)     • glucose blood (FREESTYLE LITE) test strip Check blood sugar 4 times daily (Patient not taking: Reported on 2/10/2023) 200 strip 6   • insulin glargine (Basaglar KwikPen) 100 units/mL injection pen 18 units daily (Patient not taking: Reported on 11/4/2022) 15 mL 2   • Insulin Pen Needle (BD Pen Needle Ibis U/F) 32G X 4 MM MISC 4 times daily (Patient not taking: Reported on 11/4/2022) 400 each 3   • traZODone (DESYREL) 50 mg tablet 150 mg   (Patient not taking: Reported on 8/3/2022)       No current facility-administered medications for this visit  Allergies   Allergen Reactions   • Bupropion Rash       Objective   Vitals: Blood pressure 116/78, pulse 103, height 6' (1 829 m), weight 82 6 kg (182 lb)  Physical Exam  Vitals reviewed  Constitutional:       Appearance: He is well-developed  HENT:      Head: Normocephalic and atraumatic  Nose: Nose normal    Eyes:      Conjunctiva/sclera: Conjunctivae normal       Pupils: Pupils are equal, round, and reactive to light  Cardiovascular:      Rate and Rhythm: Normal rate and regular rhythm  Pulses: no weak pulses          Dorsalis pedis pulses are 1+ on the right side and 1+ on the left side  Posterior tibial pulses are 1+ on the right side and 1+ on the left side  Heart sounds: Normal heart sounds  Pulmonary:      Effort: Pulmonary effort is normal       Breath sounds: Normal breath sounds  Abdominal:      General: Bowel sounds are normal       Palpations: Abdomen is soft  Musculoskeletal:         General: Normal range of motion  Cervical back: Normal range of motion and neck supple  Feet:      Right foot:      Skin integrity: No ulcer, skin breakdown, erythema, warmth, callus or dry skin  Left foot:      Skin integrity: No ulcer, skin breakdown, erythema, warmth, callus or dry skin  Skin:     General: Skin is warm and dry  Neurological:      Mental Status: He is alert and oriented to person, place, and time  Psychiatric:         Behavior: Behavior normal          Thought Content: Thought content normal          Judgment: Judgment normal          Patient's shoes and socks removed  Right Foot/Ankle   Right Foot Inspection  Skin Exam: skin normal and skin intact  No dry skin, no warmth, no callus, no erythema, no maceration, no abnormal color, no pre-ulcer, no ulcer and no callus  Toe Exam: ROM and strength within normal limits       Sensory   Monofilament testing: intact    Vascular  Capillary refills: < 3 seconds  The right DP pulse is 1+  The right PT pulse is 1+  Left Foot/Ankle  Left Foot Inspection  Skin Exam: skin normal and skin intact  No dry skin, no warmth, no erythema, no maceration, normal color, no pre-ulcer, no ulcer and no callus  Toe Exam: ROM and strength within normal limits  Sensory   Monofilament testing: intact    Vascular  Capillary refills: < 3 seconds  The left DP pulse is 1+  The left PT pulse is 1+       Assign Risk Category  No deformity present  No loss of protective sensation  No weak pulses  Risk: 0      Lab Results:   Lab Results   Component Value Date/Time    Hemoglobin A1C 5 5 02/03/2023 11:22 AM    Hemoglobin A1C 5 5 10/20/2022 08:31 AM    Hemoglobin A1C 6 6 (H) 07/29/2022 09:13 AM    White Blood Cell Count 5 0 02/03/2023 11:22 AM    White Blood Cell Count 4 3 10/20/2022 08:31 AM    White Blood Cell Count 4 6 04/11/2022 07:31 AM    Hemoglobin 16 7 02/03/2023 11:22 AM    Hemoglobin 15 7 10/20/2022 08:31 AM    Hemoglobin 15 8 04/11/2022 07:31 AM    HCT 46 7 02/03/2023 11:22 AM    HCT 45 3 10/20/2022 08:31 AM    HCT 46 0 04/11/2022 07:31 AM    MCV 82 02/03/2023 11:22 AM    MCV 84 10/20/2022 08:31 AM    MCV 84 04/11/2022 07:31 AM    Platelet Count 715 48/78/2088 11:22 AM    Platelet Count 627 44/34/7653 08:31 AM    Platelet Count 399 46/43/6889 07:31 AM    BUN 17 02/03/2023 11:22 AM    BUN 14 10/20/2022 08:31 AM    BUN 10 07/29/2022 09:13 AM    Potassium 4 5 02/03/2023 11:22 AM    Potassium 4 5 10/20/2022 08:31 AM    Potassium 3 7 07/29/2022 09:13 AM    Chloride 98 02/03/2023 11:22 AM    Chloride 102 10/20/2022 08:31 AM    Chloride 102 07/29/2022 09:13 AM    CO2 23 02/03/2023 11:22 AM    CO2 27 10/20/2022 08:31 AM    CO2 24 07/29/2022 09:13 AM    Creatinine 1 13 02/03/2023 11:22 AM    Creatinine 0 98 10/20/2022 08:31 AM    Creatinine 0 95 07/29/2022 09:13 AM    AST 15 02/03/2023 11:22 AM    AST 13 10/20/2022 08:31 AM    AST 10 07/29/2022 09:13 AM    ALT 14 02/03/2023 11:22 AM ALT 17 10/20/2022 08:31 AM    ALT 14 07/29/2022 09:13 AM    Albumin 4 7 02/03/2023 11:22 AM    Albumin 4 7 10/20/2022 08:31 AM    Albumin 4 4 07/29/2022 09:13 AM    Globulin, Total 2 4 02/03/2023 11:22 AM    Globulin, Total 2 2 10/20/2022 08:31 AM    Globulin, Total 2 2 07/29/2022 09:13 AM    HDL 36 (L) 10/20/2022 08:31 AM    HDL 29 (L) 04/11/2022 07:31 AM    Triglycerides 56 10/20/2022 08:31 AM    Triglycerides 233 (H) 04/11/2022 07:31 AM     Portions of the record may have been created with voice recognition software  Occasional wrong word or "sound a like" substitutions may have occurred due to the inherent limitations of voice recognition software  Read the chart carefully and recognize, using context, where substitutions have occurred

## 2023-02-10 NOTE — PATIENT INSTRUCTIONS
Be mindful of diet  Stay active and stay hydrated  You Hemoglobin A1c continues at 5 5  Keep up the good work !!!!!!!!! We made some slight changes to your basal rate  Continue to utilize the OmniPod and the Dexcom continuous glucose monitor  Continue 5000 units of Vitamin D 3 daily

## 2023-02-23 DIAGNOSIS — E10.9 TYPE 1 DIABETES MELLITUS WITHOUT COMPLICATION (HCC): ICD-10-CM

## 2023-02-23 RX ORDER — BLOOD-GLUCOSE SENSOR
EACH MISCELLANEOUS
Qty: 9 EACH | Refills: 3 | Status: SHIPPED | OUTPATIENT
Start: 2023-02-23

## 2023-02-24 DIAGNOSIS — E10.9 TYPE 1 DIABETES MELLITUS WITHOUT COMPLICATION (HCC): ICD-10-CM

## 2023-02-24 RX ORDER — INSULIN PMP CART,AUT,G6/7,CNTR
EACH SUBCUTANEOUS
Qty: 30 EACH | Refills: 0 | Status: SHIPPED | OUTPATIENT
Start: 2023-02-24

## 2023-02-25 DIAGNOSIS — E10.9 TYPE 1 DIABETES MELLITUS WITHOUT COMPLICATION (HCC): ICD-10-CM

## 2023-02-27 RX ORDER — BLOOD-GLUCOSE TRANSMITTER
EACH MISCELLANEOUS
Qty: 1 EACH | Refills: 0 | Status: SHIPPED | OUTPATIENT
Start: 2023-02-27

## 2023-03-10 DIAGNOSIS — E10.9 TYPE 1 DIABETES MELLITUS WITHOUT COMPLICATION (HCC): Primary | ICD-10-CM

## 2023-03-10 RX ORDER — INSULIN LISPRO 100 [IU]/ML
INJECTION, SOLUTION INTRAVENOUS; SUBCUTANEOUS
Qty: 20 ML | Refills: 2 | Status: SHIPPED | OUTPATIENT
Start: 2023-03-10

## 2023-03-10 RX ORDER — BLOOD-GLUCOSE,RECEIVER,CONT
1 EACH MISCELLANEOUS
Qty: 1 EACH | Refills: 0 | Status: SHIPPED | OUTPATIENT
Start: 2023-03-10 | End: 2023-03-14 | Stop reason: SDUPTHER

## 2023-03-10 RX ORDER — BLOOD-GLUCOSE SENSOR
1 EACH MISCELLANEOUS
Qty: 2 EACH | Refills: 6 | Status: SHIPPED | OUTPATIENT
Start: 2023-03-10 | End: 2023-03-14 | Stop reason: SDUPTHER

## 2023-03-10 NOTE — TELEPHONE ENCOUNTER
We are having issues getting these to go through  Please send to [facility]  Apparently providers haven't been having issues

## 2023-03-14 ENCOUNTER — TELEPHONE (OUTPATIENT)
Dept: ENDOCRINOLOGY | Facility: HOSPITAL | Age: 23
End: 2023-03-14

## 2023-03-14 DIAGNOSIS — E10.9 TYPE 1 DIABETES MELLITUS WITHOUT COMPLICATION (HCC): ICD-10-CM

## 2023-03-14 RX ORDER — BLOOD-GLUCOSE SENSOR
1 EACH MISCELLANEOUS
Qty: 2 EACH | Refills: 6 | Status: SHIPPED | OUTPATIENT
Start: 2023-03-14 | End: 2023-03-17 | Stop reason: SDUPTHER

## 2023-03-14 RX ORDER — BLOOD-GLUCOSE,RECEIVER,CONT
1 EACH MISCELLANEOUS
Qty: 1 EACH | Refills: 0 | Status: SHIPPED | OUTPATIENT
Start: 2023-03-14

## 2023-03-14 NOTE — TELEPHONE ENCOUNTER
Received notification from [facility] that they at this time do not have G7 to dispense  The form was filled out the cancel the prescription for the [address] [facility] with them and that we will send to his local [facility] per his mother who I spoke with this morning to make aware of the situation

## 2023-03-17 DIAGNOSIS — E10.9 TYPE 1 DIABETES MELLITUS WITHOUT COMPLICATION (HCC): ICD-10-CM

## 2023-03-17 RX ORDER — BLOOD-GLUCOSE SENSOR
1 EACH MISCELLANEOUS CONTINUOUS
Qty: 3 EACH | Refills: 6 | Status: SHIPPED | OUTPATIENT
Start: 2023-03-17

## 2023-03-17 RX ORDER — BLOOD-GLUCOSE SENSOR
1 EACH MISCELLANEOUS CONTINUOUS
Qty: 3 EACH | Refills: 6 | Status: SHIPPED | OUTPATIENT
Start: 2023-03-17 | End: 2023-03-17 | Stop reason: SDUPTHER

## 2023-03-17 NOTE — TELEPHONE ENCOUNTER
Patient is requesting the script for the Dexcom sensors be sent to [facility] in hopes that they will have them in stock for his next refill  Can you please send 9 sensors to [facility] since I can't get it to go through?

## 2023-03-17 NOTE — TELEPHONE ENCOUNTER
Requested medication(s) are due for refill today: Yes  Patient has already received a courtesy refill: No  Other reason request has been forwarded to provider: needed  new script sent over,  error on previous script

## 2023-03-30 DIAGNOSIS — E10.9 TYPE 1 DIABETES MELLITUS WITHOUT COMPLICATION (HCC): Primary | ICD-10-CM

## 2023-03-30 RX ORDER — INSULIN PUMP CONTROLLER
EACH MISCELLANEOUS
Qty: 6 EACH | Refills: 0 | Status: SHIPPED | OUTPATIENT
Start: 2023-03-30

## 2023-03-30 RX ORDER — INSULIN PUMP CONTROLLER
EACH MISCELLANEOUS
Qty: 1 KIT | Refills: 0 | Status: SHIPPED | OUTPATIENT
Start: 2023-03-30

## 2023-05-04 LAB
ALBUMIN SERPL-MCNC: 4.5 G/DL (ref 4.1–5.2)
ALBUMIN/GLOB SERPL: 2 {RATIO} (ref 1.2–2.2)
ALP SERPL-CCNC: 90 IU/L (ref 44–121)
ALT SERPL-CCNC: 17 IU/L (ref 0–44)
AST SERPL-CCNC: 14 IU/L (ref 0–40)
BASOPHILS # BLD AUTO: 0 X10E3/UL (ref 0–0.2)
BASOPHILS NFR BLD AUTO: 1 %
BILIRUB SERPL-MCNC: 0.5 MG/DL (ref 0–1.2)
BUN SERPL-MCNC: 19 MG/DL (ref 6–20)
BUN/CREAT SERPL: 19 (ref 9–20)
CALCIUM SERPL-MCNC: 9.8 MG/DL (ref 8.7–10.2)
CHLORIDE SERPL-SCNC: 102 MMOL/L (ref 96–106)
CHOLEST SERPL-MCNC: 210 MG/DL (ref 100–199)
CO2 SERPL-SCNC: 26 MMOL/L (ref 20–29)
CREAT SERPL-MCNC: 0.99 MG/DL (ref 0.76–1.27)
EGFR: 110 ML/MIN/1.73
EOSINOPHIL # BLD AUTO: 0.1 X10E3/UL (ref 0–0.4)
EOSINOPHIL NFR BLD AUTO: 1 %
ERYTHROCYTE [DISTWIDTH] IN BLOOD BY AUTOMATED COUNT: 13 % (ref 11.6–15.4)
EST. AVERAGE GLUCOSE BLD GHB EST-MCNC: 131 MG/DL
GLOBULIN SER-MCNC: 2.2 G/DL (ref 1.5–4.5)
GLUCOSE SERPL-MCNC: 128 MG/DL (ref 70–99)
HBA1C MFR BLD: 6.2 % (ref 4.8–5.6)
HCT VFR BLD AUTO: 43.4 % (ref 37.5–51)
HDLC SERPL-MCNC: 36 MG/DL
HGB BLD-MCNC: 14.9 G/DL (ref 13–17.7)
IMM GRANULOCYTES # BLD: 0 X10E3/UL (ref 0–0.1)
IMM GRANULOCYTES NFR BLD: 0 %
LDLC SERPL CALC-MCNC: 152 MG/DL (ref 0–99)
LYMPHOCYTES # BLD AUTO: 3.6 X10E3/UL (ref 0.7–3.1)
LYMPHOCYTES NFR BLD AUTO: 55 %
MCH RBC QN AUTO: 28.8 PG (ref 26.6–33)
MCHC RBC AUTO-ENTMCNC: 34.3 G/DL (ref 31.5–35.7)
MCV RBC AUTO: 84 FL (ref 79–97)
MONOCYTES # BLD AUTO: 0.5 X10E3/UL (ref 0.1–0.9)
MONOCYTES NFR BLD AUTO: 7 %
NEUTROPHILS # BLD AUTO: 2.3 X10E3/UL (ref 1.4–7)
NEUTROPHILS NFR BLD AUTO: 36 %
PLATELET # BLD AUTO: 214 X10E3/UL (ref 150–450)
POTASSIUM SERPL-SCNC: 4.3 MMOL/L (ref 3.5–5.2)
PROT SERPL-MCNC: 6.7 G/DL (ref 6–8.5)
RBC # BLD AUTO: 5.18 X10E6/UL (ref 4.14–5.8)
SL AMB VLDL CHOLESTEROL CALC: 22 MG/DL (ref 5–40)
SODIUM SERPL-SCNC: 142 MMOL/L (ref 134–144)
TRIGL SERPL-MCNC: 120 MG/DL (ref 0–149)
WBC # BLD AUTO: 6.5 X10E3/UL (ref 3.4–10.8)

## 2023-05-05 ENCOUNTER — OFFICE VISIT (OUTPATIENT)
Dept: ENDOCRINOLOGY | Facility: HOSPITAL | Age: 23
End: 2023-05-05

## 2023-05-05 VITALS
HEART RATE: 114 BPM | BODY MASS INDEX: 23.11 KG/M2 | HEIGHT: 72 IN | SYSTOLIC BLOOD PRESSURE: 144 MMHG | DIASTOLIC BLOOD PRESSURE: 90 MMHG | WEIGHT: 170.6 LBS

## 2023-05-05 DIAGNOSIS — E10.9 TYPE 1 DIABETES MELLITUS WITHOUT COMPLICATION (HCC): Primary | ICD-10-CM

## 2023-05-05 DIAGNOSIS — R73.9 HYPERGLYCEMIA: ICD-10-CM

## 2023-05-05 DIAGNOSIS — E55.9 VITAMIN D DEFICIENCY: ICD-10-CM

## 2023-05-05 RX ORDER — DEXTROAMPHETAMINE SACCHARATE, AMPHETAMINE ASPARTATE, DEXTROAMPHETAMINE SULFATE AND AMPHETAMINE SULFATE 3.75; 3.75; 3.75; 3.75 MG/1; MG/1; MG/1; MG/1
1 TABLET ORAL 2 TIMES DAILY
COMMUNITY
Start: 2023-04-25

## 2023-05-05 NOTE — PROGRESS NOTES
Octavio Duron 25 y o  male MRN: [id number]    Encounter: [id number]      Assessment/Plan     Assessment: This is a 25y o -year-old male with type 1 diabetes with hyperglycemia and vitamin-D deficiency  Plan:  1  Type 1 diabetes:  His most recent hemoglobin A1c is 6  2  Franki Pikes of his Dexcom continuous glucose monitor and his Omnipod 5 pump download, he is relatively well controlled however, with hyperglycemia after dinner and some hyperglycemia of unknown origin at 3 AM   His insulin to carbohydrate ratios remain the same  Reviewed recognition and proper treatment of hypoglycemic episodes   He will contact the office with any problems   Check hemoglobin A1c and comprehensive metabolic panel prior to next visit      2   Vitamin-D deficiency:  I have asked him to continue supplementation with 5000 units of vitamin D3 daily   Will continue to monitor level  CC: Type 1 Diabetes follow up    History of Present Illness     HPI:  22 y  o  year old male with type 1 diabetes for approximately 2 years   He is on insulin and Omnipod 5 with a Dexcom continuous glucose monitor       His pump settings are as follows:  Basal  Midnight - 0 65  Insulin to carbohydrate ratio  Midnight - 1:9  Sensitivity - 50  Blood glucose target range - 110     Most recent hemoglobin A1c from May 3, 2023 is 6  2  He denies any polyuria, polydipsia, nocturia and blurry vision   He denies neuropathy, nephropathy and retinopathy   Recent diabetic foot exam was performed in office visit on February 10, 2023      Hypoglycemic episodes: No, rare  Does have intranasal glucagon in case of severe hypoglycemia      Blood Sugar/Glucometer/Pump/CGM review:  Dex com download from April 12 through May 5, 2023 shows an average glucose of 178 with a standard deviation of 69   He is in target range 80% of the time with 19% high blood sugars  There is less than 1% low blood sugar on this report      Most recent 25 hydroxy vitamin-D value from February 3, 2022 is 39 0  [facility] is currently supplementing with 5000 units of vitamin-D  Review of Systems   Constitutional: Negative  Negative for chills, fatigue and fever  HENT: Negative  Negative for trouble swallowing and voice change  Eyes: Negative for photophobia, pain, discharge, redness, itching and visual disturbance  Respiratory: Negative for cough and shortness of breath  Cardiovascular: Negative for chest pain and palpitations  Gastrointestinal: Negative for abdominal pain, constipation, diarrhea, nausea and vomiting  Endocrine: Negative for cold intolerance, heat intolerance, polydipsia, polyphagia and polyuria  Genitourinary: Negative  Musculoskeletal: Negative  Skin: Negative  Allergic/Immunologic: Negative  Neurological: Negative for syncope, light-headedness and headaches  Hematological: Negative  Psychiatric/Behavioral: The patient is nervous/anxious  All other systems reviewed and are negative  Historical Information   History reviewed  No pertinent past medical history  History reviewed  No pertinent surgical history    Social History   Social History     Substance and Sexual Activity   Alcohol Use Not Currently    Alcohol/week: 2 0 standard drinks    Types: 2 Cans of beer per week    Comment: some beer with dinner      Social History     Substance and Sexual Activity   Drug Use Never     Social History     Tobacco Use   Smoking Status Never   Smokeless Tobacco Never     Family History:   Family History   Problem Relation Age of Onset   Raquel Carlos Enrique Migraines Mother     Breast cancer Mother     Heart disease Father     Addiction problem Father     Diabetes unspecified Father     Pancreatitis Father     No Known Problems Sister     Heart disease Maternal Grandmother     Heart disease Maternal Grandfather     Prostate cancer Maternal Grandfather        Meds/Allergies   Current Outpatient Medications   Medication Sig Dispense Refill    amphetamine-dextroamphetamine (ADDERALL) 15 MG tablet Take 1 tablet by mouth 2 (two) times a day      Cholecalciferol (RA Vitamin D-3) 125 MCG (5000 UT) capsule take 1 capsule by mouth once daily 30 capsule 5    Continuous Blood Gluc  (Dexcom G7 ) IRMA Use 1 Device 4 (four) times a day (before meals and at bedtime) 1 each 0    Continuous Blood Gluc Sensor (Dexcom G7 Sensor) MISC Use 1 each continuous Replace sensor every 10 day 3 each 6    Continuous Blood Gluc Transmit (Dexcom G6 Transmitter) MISC q 90 days 1 each 0    escitalopram (LEXAPRO) 20 mg tablet Take 20 mg by mouth daily      Glucagon (Baqsimi One Pack) 3 MG/DOSE POWD 3 mg prn severe hypoglycemia 2 each 1    Insulin Disposable Pump (Omnipod DASH Intro, Gen 4,) KIT Change every 3 days  1 kit 0    Insulin Disposable Pump (Omnipod DASH Pods, Gen 4,) MISC Change every 72 hours  6 each 0    insulin lispro (HumaLOG) 100 units/mL injection Use up to 50 units daily via insulin pump   20 mL 2    multivitamin (THERAGRAN) TABS Take 1 tablet by mouth if needed      traZODone (DESYREL) 50 mg tablet 150 mg      Accu-Chek FastClix Lancets MISC 4 times daily (Patient not taking: Reported on 11/4/2022) 100 each 3    acetone, urine, test (Ketostix) strip Use prn hyperglycemia (Patient not taking: Reported on 11/4/2022) 25 each 0    amphetamine-dextroamphetamine (ADDERALL XR) 20 MG 24 hr capsule Take 20 mg by mouth daily (Patient not taking: Reported on 5/5/2023)      Blood Glucose Monitoring Suppl (FreeStyle Lite) w/Device KIT Check blood sugars 4 times daily (Patient not taking: Reported on 2/10/2023) 1 kit 0    FLUoxetine (PROzac) 40 MG capsule Take 80 mg by mouth daily (Patient not taking: Reported on 8/3/2022)      glucose blood (FREESTYLE LITE) test strip Check blood sugar 4 times daily (Patient not taking: Reported on 2/10/2023) 200 strip 6    insulin glargine (Basaglar KwikPen) 100 units/mL injection pen 18 units daily (Patient not taking: Reported on 11/4/2022) 15 mL 2    Insulin Pen Needle (BD Pen Needle Ibis U/F) 32G X 4 MM MISC 4 times daily (Patient not taking: Reported on 11/4/2022) 400 each 3    mirtazapine (REMERON) 15 mg tablet Take 15 mg by mouth daily at bedtime (Patient not taking: Reported on 5/5/2023)       No current facility-administered medications for this visit  Allergies   Allergen Reactions    Bupropion Rash       Objective   Vitals: Blood pressure 144/90, pulse (!) 114, height 6' (1 829 m), weight 77 4 kg (170 lb 9 6 oz)  Physical Exam  Vitals reviewed  Constitutional:       Appearance: He is well-developed  HENT:      Head: Normocephalic and atraumatic  Nose: Nose normal    Eyes:      Conjunctiva/sclera: Conjunctivae normal       Pupils: Pupils are equal, round, and reactive to light  Cardiovascular:      Rate and Rhythm: Normal rate and regular rhythm  Heart sounds: Normal heart sounds  Pulmonary:      Effort: Pulmonary effort is normal       Breath sounds: Normal breath sounds  Abdominal:      General: Bowel sounds are normal       Palpations: Abdomen is soft  Musculoskeletal:         General: Normal range of motion  Cervical back: Normal range of motion and neck supple  Skin:     General: Skin is warm and dry  Neurological:      Mental Status: He is alert and oriented to person, place, and time  Psychiatric:         Behavior: Behavior normal          Thought Content:  Thought content normal          Judgment: Judgment normal        Lab Results:   Lab Results   Component Value Date/Time    Hemoglobin A1C 6 2 (H) 05/03/2023 08:11 AM    Hemoglobin A1C 5 5 02/03/2023 11:22 AM    Hemoglobin A1C 5 5 10/20/2022 08:31 AM    White Blood Cell Count 6 5 05/03/2023 08:11 AM    White Blood Cell Count 5 0 02/03/2023 11:22 AM    White Blood Cell Count 4 3 10/20/2022 08:31 AM    Hemoglobin 14 9 05/03/2023 08:11 AM    Hemoglobin 16 7 02/03/2023 11:22 AM    Hemoglobin 15 7 10/20/2022 08:31 AM "   HCT 43 4 05/03/2023 08:11 AM    HCT 46 7 02/03/2023 11:22 AM    HCT 45 3 10/20/2022 08:31 AM    MCV 84 05/03/2023 08:11 AM    MCV 82 02/03/2023 11:22 AM    MCV 84 10/20/2022 08:31 AM    Platelet Count 210 21/84/9531 08:11 AM    Platelet Count 765 40/18/1661 11:22 AM    Platelet Count 939 14/76/5312 08:31 AM    BUN 19 05/03/2023 08:11 AM    BUN 17 02/03/2023 11:22 AM    BUN 14 10/20/2022 08:31 AM    Potassium 4 3 05/03/2023 08:11 AM    Potassium 4 5 02/03/2023 11:22 AM    Potassium 4 5 10/20/2022 08:31 AM    Chloride 102 05/03/2023 08:11 AM    Chloride 98 02/03/2023 11:22 AM    Chloride 102 10/20/2022 08:31 AM    CO2 26 05/03/2023 08:11 AM    CO2 23 02/03/2023 11:22 AM    CO2 27 10/20/2022 08:31 AM    Creatinine 0 99 05/03/2023 08:11 AM    Creatinine 1 13 02/03/2023 11:22 AM    Creatinine 0 98 10/20/2022 08:31 AM    AST 14 05/03/2023 08:11 AM    AST 15 02/03/2023 11:22 AM    AST 13 10/20/2022 08:31 AM    ALT 17 05/03/2023 08:11 AM    ALT 14 02/03/2023 11:22 AM    ALT 17 10/20/2022 08:31 AM    Albumin 4 5 05/03/2023 08:11 AM    Albumin 4 7 02/03/2023 11:22 AM    Albumin 4 7 10/20/2022 08:31 AM    Globulin, Total 2 2 05/03/2023 08:11 AM    Globulin, Total 2 4 02/03/2023 11:22 AM    Globulin, Total 2 2 10/20/2022 08:31 AM    HDL 36 (L) 05/03/2023 08:11 AM    HDL 36 (L) 10/20/2022 08:31 AM    Triglycerides 120 05/03/2023 08:11 AM    Triglycerides 56 10/20/2022 08:31 AM     Portions of the record may have been created with voice recognition software  Occasional wrong word or \"sound a like\" substitutions may have occurred due to the inherent limitations of voice recognition software  Read the chart carefully and recognize, using context, where substitutions have occurred    "

## 2023-05-05 NOTE — PATIENT INSTRUCTIONS
Stay active and stay hydrated  You Hemoglobin A1c is 6 2  Keep up the good work !!!!!!!!! We made some slight changes to your on insulin to carbohydrate ratio at dinner  Continue to utilize the OmniPod and the Dexcom continuous glucose monitor  Continue 5000 units of Vitamin D 3 daily

## 2023-06-14 DIAGNOSIS — E10.9 TYPE 1 DIABETES MELLITUS WITHOUT COMPLICATION (HCC): ICD-10-CM

## 2023-06-14 RX ORDER — BLOOD-GLUCOSE SENSOR
1 EACH MISCELLANEOUS CONTINUOUS
Qty: 9 EACH | Refills: 3 | Status: SHIPPED | OUTPATIENT
Start: 2023-06-14

## 2023-06-14 RX ORDER — INSULIN LISPRO 100 [IU]/ML
INJECTION, SOLUTION INTRAVENOUS; SUBCUTANEOUS
Qty: 20 ML | Refills: 2 | Status: SHIPPED | OUTPATIENT
Start: 2023-06-14

## 2023-06-14 RX ORDER — INSULIN PUMP CONTROLLER
EACH MISCELLANEOUS
Qty: 6 EACH | Refills: 0 | Status: SHIPPED | OUTPATIENT
Start: 2023-06-14

## 2023-07-21 DIAGNOSIS — E55.9 VITAMIN D DEFICIENCY: ICD-10-CM

## 2023-08-03 LAB
ALBUMIN SERPL-MCNC: 4.6 G/DL (ref 4.3–5.2)
ALBUMIN/CREAT UR: 3 MG/G CREAT (ref 0–29)
ALBUMIN/GLOB SERPL: 2.1 {RATIO} (ref 1.2–2.2)
ALP SERPL-CCNC: 82 IU/L (ref 44–121)
ALT SERPL-CCNC: 12 IU/L (ref 0–44)
AST SERPL-CCNC: 12 IU/L (ref 0–40)
BASOPHILS # BLD AUTO: 0.1 X10E3/UL (ref 0–0.2)
BASOPHILS NFR BLD AUTO: 1 %
BILIRUB SERPL-MCNC: 0.6 MG/DL (ref 0–1.2)
BUN SERPL-MCNC: 17 MG/DL (ref 6–20)
BUN/CREAT SERPL: 19 (ref 9–20)
CALCIUM SERPL-MCNC: 9.4 MG/DL (ref 8.7–10.2)
CHLORIDE SERPL-SCNC: 100 MMOL/L (ref 96–106)
CO2 SERPL-SCNC: 25 MMOL/L (ref 20–29)
CREAT SERPL-MCNC: 0.91 MG/DL (ref 0.76–1.27)
CREAT UR-MCNC: 186 MG/DL
EGFR: 122 ML/MIN/1.73
EOSINOPHIL # BLD AUTO: 0.1 X10E3/UL (ref 0–0.4)
EOSINOPHIL NFR BLD AUTO: 2 %
ERYTHROCYTE [DISTWIDTH] IN BLOOD BY AUTOMATED COUNT: 12.9 % (ref 11.6–15.4)
EST. AVERAGE GLUCOSE BLD GHB EST-MCNC: 126 MG/DL
GLOBULIN SER-MCNC: 2.2 G/DL (ref 1.5–4.5)
GLUCOSE SERPL-MCNC: 126 MG/DL (ref 70–99)
HBA1C MFR BLD: 6 % (ref 4.8–5.6)
HCT VFR BLD AUTO: 44.6 % (ref 37.5–51)
HGB BLD-MCNC: 15 G/DL (ref 13–17.7)
IMM GRANULOCYTES # BLD: 0 X10E3/UL (ref 0–0.1)
IMM GRANULOCYTES NFR BLD: 0 %
LYMPHOCYTES # BLD AUTO: 3.8 X10E3/UL (ref 0.7–3.1)
LYMPHOCYTES NFR BLD AUTO: 54 %
MCH RBC QN AUTO: 29.1 PG (ref 26.6–33)
MCHC RBC AUTO-ENTMCNC: 33.6 G/DL (ref 31.5–35.7)
MCV RBC AUTO: 86 FL (ref 79–97)
MICROALBUMIN UR-MCNC: 6.4 UG/ML
MONOCYTES # BLD AUTO: 0.6 X10E3/UL (ref 0.1–0.9)
MONOCYTES NFR BLD AUTO: 9 %
NEUTROPHILS # BLD AUTO: 2.3 X10E3/UL (ref 1.4–7)
NEUTROPHILS NFR BLD AUTO: 34 %
PLATELET # BLD AUTO: 211 X10E3/UL (ref 150–450)
POTASSIUM SERPL-SCNC: 4.4 MMOL/L (ref 3.5–5.2)
PROT SERPL-MCNC: 6.8 G/DL (ref 6–8.5)
RBC # BLD AUTO: 5.16 X10E6/UL (ref 4.14–5.8)
SODIUM SERPL-SCNC: 140 MMOL/L (ref 134–144)
TSH SERPL DL<=0.005 MIU/L-ACNC: 3 UIU/ML (ref 0.45–4.5)
WBC # BLD AUTO: 6.9 X10E3/UL (ref 3.4–10.8)

## 2023-08-11 ENCOUNTER — OFFICE VISIT (OUTPATIENT)
Dept: ENDOCRINOLOGY | Facility: HOSPITAL | Age: 23
End: 2023-08-11
Payer: COMMERCIAL

## 2023-08-11 VITALS
DIASTOLIC BLOOD PRESSURE: 76 MMHG | OXYGEN SATURATION: 98 % | WEIGHT: 164 LBS | HEIGHT: 72 IN | HEART RATE: 102 BPM | BODY MASS INDEX: 22.21 KG/M2 | SYSTOLIC BLOOD PRESSURE: 102 MMHG

## 2023-08-11 DIAGNOSIS — R73.9 HYPERGLYCEMIA: ICD-10-CM

## 2023-08-11 DIAGNOSIS — E55.9 VITAMIN D DEFICIENCY: ICD-10-CM

## 2023-08-11 DIAGNOSIS — E10.9 TYPE 1 DIABETES MELLITUS WITHOUT COMPLICATION (HCC): Primary | ICD-10-CM

## 2023-08-11 PROCEDURE — 95251 CONT GLUC MNTR ANALYSIS I&R: CPT | Performed by: NURSE PRACTITIONER

## 2023-08-11 PROCEDURE — 99214 OFFICE O/P EST MOD 30 MIN: CPT | Performed by: NURSE PRACTITIONER

## 2023-08-11 RX ORDER — VENLAFAXINE HYDROCHLORIDE 75 MG/1
CAPSULE, EXTENDED RELEASE ORAL DAILY
COMMUNITY
Start: 2023-07-19

## 2023-08-11 RX ORDER — INSULIN PUMP CONTROLLER
EACH MISCELLANEOUS
Qty: 6 EACH | Refills: 6 | Status: SHIPPED | OUTPATIENT
Start: 2023-08-11

## 2023-08-11 RX ORDER — HYDROXYZINE HYDROCHLORIDE 25 MG/1
TABLET, FILM COATED ORAL
COMMUNITY
Start: 2023-07-19

## 2023-08-11 RX ORDER — DEXTROAMPHETAMINE SACCHARATE, AMPHETAMINE ASPARTATE, DEXTROAMPHETAMINE SULFATE AND AMPHETAMINE SULFATE 5; 5; 5; 5 MG/1; MG/1; MG/1; MG/1
20 TABLET ORAL
COMMUNITY

## 2023-08-11 RX ORDER — ACYCLOVIR 400 MG/1
1 TABLET ORAL CONTINUOUS
Qty: 9 EACH | Refills: 3 | Status: SHIPPED | OUTPATIENT
Start: 2023-08-11

## 2023-08-11 NOTE — PROGRESS NOTES
Geraldine Gomez 25 y.o. male MRN: [id number]    Encounter: [id number]      Assessment/Plan     Assessment: This is a 25y.o.-year-old male with type 1 diabetes with hyperglycemia and vitamin-D deficiency.       Plan:  1. Type 1 diabetes:  His most recent hemoglobin A1c is 6.0.  Review of his Dexcom continuous glucose monitor and his Omnipod 5 pump download, he is relatively well controlled however, with hyperglycemia overnight. For this, have adjusted his basal rate at midnight to 0.7. Otherwise, his insulin to carbohydrate ratios remain the same. Reviewed recognition and proper treatment of hypoglycemic episodes.  He will contact the office with any problems.  Check hemoglobin A1c and comprehensive metabolic panel prior to next visit.     2.  Vitamin-D deficiency:  I have asked him to continue supplementation with 5000 units of vitamin D3 daily.  Will continue to monitor level. CC: Type 1 Diabetes follow up    History of Present Illness     HPI:  22 y. o. year old male with type 1 diabetes for approximately 2 years.  He is on insulin and Omnipod 5 with a Dexcom continuous glucose monitor.      His pump settings are as follows:  Basal  Midnight - 0.65  Insulin to carbohydrate ratio  Midnight - 1:9  Sensitivity - 50  Blood glucose target range - 110     Most recent hemoglobin A1c from August 2, 2023 is 6.0. He denies any polyuria, polydipsia, nocturia and blurry vision.  He denies neuropathy, nephropathy and retinopathy.  Recent diabetic foot exam was performed in office visit on February 10, 2023.     Hypoglycemic episodes: No, rare. Does have intranasal glucagon in case of severe hypoglycemia.     Blood Sugar/Glucometer/Pump/CGM review:  Dex com download from life 29 through August 11, 2023 shows an average glucose of 178 with a standard deviation of 69.  He is in target range 73% of the time with 26 % high blood sugars. There is less than 1% low blood sugar on this report.     Most recent 25 hydroxy vitamin-D value from February 3, 2023 is 39.0.  He is currently supplementing with 5000 units of vitamin-D. Review of Systems   Constitutional: Negative. Negative for chills, fatigue and fever. HENT: Negative for trouble swallowing and voice change. Eyes: Negative for photophobia, pain, discharge, redness, itching and visual disturbance. Respiratory: Negative for cough and shortness of breath. Cardiovascular: Negative for chest pain and palpitations. Gastrointestinal: Negative for abdominal pain, constipation, diarrhea, nausea and vomiting. Endocrine: Negative for cold intolerance, heat intolerance, polydipsia, polyphagia and polyuria. Genitourinary: Negative. Musculoskeletal: Negative. Skin: Negative. Allergic/Immunologic: Negative. Neurological: Negative for dizziness, syncope, light-headedness and headaches. Hematological: Negative. Psychiatric/Behavioral: The patient is nervous/anxious. All other systems reviewed and are negative. Historical Information   History reviewed. No pertinent past medical history. History reviewed. No pertinent surgical history.   Social History   Social History     Substance and Sexual Activity   Alcohol Use Not Currently   • Alcohol/week: 2.0 standard drinks of alcohol   • Types: 2 Cans of beer per week    Comment: some beer with dinner      Social History     Substance and Sexual Activity   Drug Use Never     Social History     Tobacco Use   Smoking Status Never   Smokeless Tobacco Never     Family History:   Family History   Problem Relation Age of Onset   • Migraines Mother    • Breast cancer Mother    • Heart disease Father    • Addiction problem Father    • Diabetes unspecified Father    • Pancreatitis Father    • No Known Problems Sister    • Heart disease Maternal Grandmother    • Heart disease Maternal Grandfather    • Prostate cancer Maternal Grandfather        Meds/Allergies   Current Outpatient Medications   Medication Sig Dispense Refill   • Accu-Chek FastClix Lancets MISC 4 times daily 100 each 3   • acetone, urine, test (Ketostix) strip Use prn hyperglycemia 25 each 0   • amphetamine-dextroamphetamine (ADDERALL) 20 mg tablet Take 20 mg by mouth 2 (two) times a day     • Blood Glucose Monitoring Suppl (FreeStyle Lite) w/Device KIT Check blood sugars 4 times daily 1 kit 0   • Continuous Blood Gluc  (Dexcom G7 ) IRMA Use 1 Device 4 (four) times a day (before meals and at bedtime) 1 each 0   • Continuous Blood Gluc Sensor (Dexcom G7 Sensor) Use 1 Device continuous Replace sensor every 10 day 9 each 3   • Glucagon (Baqsimi One Pack) 3 MG/DOSE POWD 3 mg prn severe hypoglycemia 2 each 1   • glucose blood (FREESTYLE LITE) test strip Check blood sugar 4 times daily 200 strip 6   • hydrOXYzine HCL (ATARAX) 25 mg tablet 1 TO 2 AT BEDTIME     • Insulin Disposable Pump (Omnipod DASH Intro, Gen 4,) KIT Change every 3 days. 1 kit 0   • Insulin Disposable Pump (Omnipod DASH Pods, Gen 4,) MISC Change every 72 hours. 6 each 0   • insulin lispro (HumaLOG) 100 units/mL injection Use up to 50 units daily via insulin pump.  20 mL 2   • Insulin Pen Needle (BD Pen Needle Ibis U/F) 32G X 4 MM MISC 4 times daily 400 each 3   • multivitamin (THERAGRAN) TABS Take 1 tablet by mouth if needed     • traZODone (DESYREL) 50 mg tablet 50 mg if needed     • venlafaxine (EFFEXOR-XR) 75 mg 24 hr capsule Take by mouth daily     • amphetamine-dextroamphetamine (ADDERALL XR) 20 MG 24 hr capsule Take 20 mg by mouth daily (Patient not taking: Reported on 5/5/2023)     • amphetamine-dextroamphetamine (ADDERALL) 15 MG tablet Take 1 tablet by mouth 2 (two) times a day (Patient not taking: Reported on 8/11/2023)     • Cholecalciferol (Vitamin D3) 125 MCG (5000 UT) CAPS take 1 capsule by mouth once daily 30 capsule 5   • Continuous Blood Gluc Transmit (Dexcom G6 Transmitter) MISC q 90 days (Patient not taking: Reported on 8/11/2023) 1 each 0   • escitalopram (LEXAPRO) 20 mg tablet Take 20 mg by mouth daily (Patient not taking: Reported on 8/11/2023)     • FLUoxetine (PROzac) 40 MG capsule Take 80 mg by mouth daily     • insulin glargine (Basaglar KwikPen) 100 units/mL injection pen 18 units daily (Patient not taking: Reported on 11/4/2022) 15 mL 2   • mirtazapine (REMERON) 15 mg tablet Take 15 mg by mouth daily at bedtime (Patient not taking: Reported on 5/5/2023)       No current facility-administered medications for this visit. Allergies   Allergen Reactions   • Bupropion Rash       Objective   Vitals: There were no vitals taken for this visit. Physical Exam  Vitals reviewed. Constitutional:       Appearance: He is well-developed. HENT:      Head: Normocephalic and atraumatic. Nose: Nose normal.   Eyes:      Conjunctiva/sclera: Conjunctivae normal.      Pupils: Pupils are equal, round, and reactive to light. Cardiovascular:      Rate and Rhythm: Normal rate and regular rhythm. Heart sounds: Normal heart sounds. Pulmonary:      Effort: Pulmonary effort is normal.      Breath sounds: Normal breath sounds. Abdominal:      General: Bowel sounds are normal.      Palpations: Abdomen is soft. Musculoskeletal:         General: Normal range of motion. Cervical back: Normal range of motion and neck supple. Skin:     General: Skin is warm and dry. Neurological:      Mental Status: He is alert and oriented to person, place, and time. Psychiatric:         Behavior: Behavior normal.         Thought Content:  Thought content normal.         Judgment: Judgment normal.       Lab Results:   Lab Results   Component Value Date/Time    Hemoglobin A1C 6.0 (H) 08/02/2023 07:31 AM    Hemoglobin A1C 6.2 (H) 05/03/2023 08:11 AM    Hemoglobin A1C 5.5 02/03/2023 11:22 AM    White Blood Cell Count 6.9 08/02/2023 07:31 AM    White Blood Cell Count 6.5 05/03/2023 08:11 AM    White Blood Cell Count 5.0 02/03/2023 11:22 AM    Hemoglobin 15.0 08/02/2023 07:31 AM Hemoglobin 14.9 05/03/2023 08:11 AM    Hemoglobin 16.7 02/03/2023 11:22 AM    HCT 44.6 08/02/2023 07:31 AM    HCT 43.4 05/03/2023 08:11 AM    HCT 46.7 02/03/2023 11:22 AM    MCV 86 08/02/2023 07:31 AM    MCV 84 05/03/2023 08:11 AM    MCV 82 02/03/2023 11:22 AM    Platelet Count 659 67/38/3872 07:31 AM    Platelet Count 829 56/12/3007 08:11 AM    Platelet Count 608 06/34/9399 11:22 AM    BUN 17 08/02/2023 07:31 AM    BUN 19 05/03/2023 08:11 AM    BUN 17 02/03/2023 11:22 AM    Potassium 4.4 08/02/2023 07:31 AM    Potassium 4.3 05/03/2023 08:11 AM    Potassium 4.5 02/03/2023 11:22 AM    Chloride 100 08/02/2023 07:31 AM    Chloride 102 05/03/2023 08:11 AM    Chloride 98 02/03/2023 11:22 AM    CO2 25 08/02/2023 07:31 AM    CO2 26 05/03/2023 08:11 AM    CO2 23 02/03/2023 11:22 AM    Creatinine 0.91 08/02/2023 07:31 AM    Creatinine 0.99 05/03/2023 08:11 AM    Creatinine 1.13 02/03/2023 11:22 AM    AST 12 08/02/2023 07:31 AM    AST 14 05/03/2023 08:11 AM    AST 15 02/03/2023 11:22 AM    ALT 12 08/02/2023 07:31 AM    ALT 17 05/03/2023 08:11 AM    ALT 14 02/03/2023 11:22 AM    Protein, Total 6.8 08/02/2023 07:31 AM    Protein, Total 6.7 05/03/2023 08:11 AM    Protein, Total 7.1 02/03/2023 11:22 AM    Albumin 4.6 08/02/2023 07:31 AM    Albumin 4.5 05/03/2023 08:11 AM    Albumin 4.7 02/03/2023 11:22 AM    Globulin, Total 2.2 08/02/2023 07:31 AM    Globulin, Total 2.2 05/03/2023 08:11 AM    Globulin, Total 2.4 02/03/2023 11:22 AM    HDL 36 (L) 05/03/2023 08:11 AM    HDL 36 (L) 10/20/2022 08:31 AM    Triglycerides 120 05/03/2023 08:11 AM    Triglycerides 56 10/20/2022 08:31 AM     Portions of the record may have been created with voice recognition software. Occasional wrong word or "sound a like" substitutions may have occurred due to the inherent limitations of voice recognition software. Read the chart carefully and recognize, using context, where substitutions have occurred.

## 2023-08-11 NOTE — PATIENT INSTRUCTIONS
Stay active and stay hydrated. You Hemoglobin A1c is 6.0. Keep up the good work !!!!!!!!! We made some slight changes to your basal rate. Continue to utilize the OmniPod and the Dexcom continuous glucose monitor. Continue 5000 units of Vitamin D 3 daily.
abdomen

## 2023-08-17 ENCOUNTER — DOCUMENTATION (OUTPATIENT)
Dept: ENDOCRINOLOGY | Facility: HOSPITAL | Age: 23
End: 2023-08-17

## 2023-08-25 ENCOUNTER — DOCUMENTATION (OUTPATIENT)
Dept: ENDOCRINOLOGY | Facility: HOSPITAL | Age: 23
End: 2023-08-25

## 2023-09-01 DIAGNOSIS — E10.9 TYPE 1 DIABETES MELLITUS WITHOUT COMPLICATION (HCC): ICD-10-CM

## 2023-09-01 RX ORDER — INSULIN LISPRO 100 [IU]/ML
INJECTION, SOLUTION INTRAVENOUS; SUBCUTANEOUS
Qty: 50 ML | Refills: 1 | Status: SHIPPED | OUTPATIENT
Start: 2023-09-01

## 2023-11-10 LAB
ALBUMIN SERPL-MCNC: 4.9 G/DL (ref 4.3–5.2)
ALBUMIN/GLOB SERPL: 2.1 {RATIO} (ref 1.2–2.2)
ALP SERPL-CCNC: 84 IU/L (ref 44–121)
ALT SERPL-CCNC: 13 IU/L (ref 0–44)
AST SERPL-CCNC: 14 IU/L (ref 0–40)
BASOPHILS # BLD AUTO: 0 X10E3/UL (ref 0–0.2)
BASOPHILS NFR BLD AUTO: 1 %
BILIRUB SERPL-MCNC: 0.8 MG/DL (ref 0–1.2)
BUN SERPL-MCNC: 19 MG/DL (ref 6–20)
BUN/CREAT SERPL: 19 (ref 9–20)
CALCIUM SERPL-MCNC: 9.6 MG/DL (ref 8.7–10.2)
CHLORIDE SERPL-SCNC: 100 MMOL/L (ref 96–106)
CHOLEST SERPL-MCNC: 239 MG/DL (ref 100–199)
CO2 SERPL-SCNC: 25 MMOL/L (ref 20–29)
CREAT SERPL-MCNC: 1.02 MG/DL (ref 0.76–1.27)
EGFR: 106 ML/MIN/1.73
EOSINOPHIL # BLD AUTO: 0 X10E3/UL (ref 0–0.4)
EOSINOPHIL NFR BLD AUTO: 1 %
ERYTHROCYTE [DISTWIDTH] IN BLOOD BY AUTOMATED COUNT: 12.2 % (ref 11.6–15.4)
EST. AVERAGE GLUCOSE BLD GHB EST-MCNC: 117 MG/DL
GLOBULIN SER-MCNC: 2.3 G/DL (ref 1.5–4.5)
GLUCOSE SERPL-MCNC: 96 MG/DL (ref 70–99)
HBA1C MFR BLD: 5.7 % (ref 4.8–5.6)
HCT VFR BLD AUTO: 49.1 % (ref 37.5–51)
HDLC SERPL-MCNC: 35 MG/DL
HGB BLD-MCNC: 16.6 G/DL (ref 13–17.7)
IMM GRANULOCYTES # BLD: 0 X10E3/UL (ref 0–0.1)
IMM GRANULOCYTES NFR BLD: 1 %
LDLC SERPL CALC-MCNC: 187 MG/DL (ref 0–99)
LYMPHOCYTES # BLD AUTO: 1.7 X10E3/UL (ref 0.7–3.1)
LYMPHOCYTES NFR BLD AUTO: 41 %
MCH RBC QN AUTO: 29.2 PG (ref 26.6–33)
MCHC RBC AUTO-ENTMCNC: 33.8 G/DL (ref 31.5–35.7)
MCV RBC AUTO: 86 FL (ref 79–97)
MONOCYTES # BLD AUTO: 0.4 X10E3/UL (ref 0.1–0.9)
MONOCYTES NFR BLD AUTO: 9 %
NEUTROPHILS # BLD AUTO: 1.9 X10E3/UL (ref 1.4–7)
NEUTROPHILS NFR BLD AUTO: 47 %
PLATELET # BLD AUTO: 221 X10E3/UL (ref 150–450)
POTASSIUM SERPL-SCNC: 4.9 MMOL/L (ref 3.5–5.2)
PROT SERPL-MCNC: 7.2 G/DL (ref 6–8.5)
RBC # BLD AUTO: 5.68 X10E6/UL (ref 4.14–5.8)
SL AMB VLDL CHOLESTEROL CALC: 17 MG/DL (ref 5–40)
SODIUM SERPL-SCNC: 139 MMOL/L (ref 134–144)
T4 FREE SERPL-MCNC: 1.56 NG/DL (ref 0.82–1.77)
TRIGL SERPL-MCNC: 97 MG/DL (ref 0–149)
TSH SERPL DL<=0.005 MIU/L-ACNC: 0.94 UIU/ML (ref 0.45–4.5)
WBC # BLD AUTO: 4.1 X10E3/UL (ref 3.4–10.8)

## 2023-11-15 ENCOUNTER — TELEPHONE (OUTPATIENT)
Dept: ENDOCRINOLOGY | Facility: HOSPITAL | Age: 23
End: 2023-11-15

## 2023-11-15 NOTE — TELEPHONE ENCOUNTER
Received message stating the pt's mother called and stated that a PA needed to be done for the patient's Omnipod Dash through her Insurance which is [employer] as a Durable Med. She stated that they have been working on this and this is the latest that she was told needs to be done. At first I reached out to the wrong Omnipod rep for help. Today I reached out to the correct rep [employer]) and she states she is going to look into this and let me know. Mom and patient both state it's the actual Dash it's self, not the insulin.

## 2023-11-17 ENCOUNTER — OFFICE VISIT (OUTPATIENT)
Dept: ENDOCRINOLOGY | Facility: HOSPITAL | Age: 23
End: 2023-11-17
Payer: COMMERCIAL

## 2023-11-17 VITALS
WEIGHT: 155 LBS | SYSTOLIC BLOOD PRESSURE: 118 MMHG | DIASTOLIC BLOOD PRESSURE: 72 MMHG | BODY MASS INDEX: 20.99 KG/M2 | HEART RATE: 108 BPM | OXYGEN SATURATION: 97 % | HEIGHT: 72 IN

## 2023-11-17 DIAGNOSIS — E10.9 TYPE 1 DIABETES MELLITUS WITHOUT COMPLICATION (HCC): Primary | ICD-10-CM

## 2023-11-17 PROCEDURE — 99214 OFFICE O/P EST MOD 30 MIN: CPT | Performed by: PHYSICIAN ASSISTANT

## 2023-11-17 PROCEDURE — 95251 CONT GLUC MNTR ANALYSIS I&R: CPT | Performed by: PHYSICIAN ASSISTANT

## 2023-11-17 RX ORDER — INSULIN LISPRO 100 [IU]/ML
INJECTION, SOLUTION INTRAVENOUS; SUBCUTANEOUS
Qty: 60 ML | Refills: 1 | Status: SHIPPED | OUTPATIENT
Start: 2023-11-17

## 2023-11-17 RX ORDER — GLUCAGON 3 MG/1
POWDER NASAL
Qty: 2 EACH | Refills: 1 | Status: SHIPPED | OUTPATIENT
Start: 2023-11-17

## 2023-11-17 NOTE — PATIENT INSTRUCTIONS
Hemoglobin A1c is doing well. No adjustments to his insulin pump today. Continue utilizing Dexcom to monitor glucose levels. Contact the office with any concerns or questions. Follow-up in 3 months with lab work completed prior to visit.

## 2023-11-17 NOTE — PROGRESS NOTES
Azucena Blake 21 y.o. male MRN: [id number]    Encounter: [id number]      Assessment/Plan     Assessment: This is a 21y.o.-year-old male with hyperglycemia and vitamin D deficiency. Plan:  Type 1 diabetes: Hemoglobin A1c was 5.7. Review of his Dexcom download he does not have any significant episodes of hyperglycemia at this time, so will not make any adjustments to his insulin pump. If there is any concerns with glucose levels over the next 3 months, contact the office. Follow-up in 3 months with lab work completed prior to visit. 2.  Vitamin D deficiency: Vitamin D levels doing well. Continue with vitamin D 50,000 units today. We will continue to monitor over time. CC: Type 1 diabetes follow-up    History of Present Illness     HPI:  21year old male with type 1 diabetes for approximately 2 years. He is on insulin and Omnipod 5 with a Dexcom continuous glucose monitor. His pump settings are as follows:  Basal  Midnight - 0.5  Insulin to carbohydrate ratio  12 AM 1 unit for every 8 g carbohydrates, 5 AM 1 unit for every 10 g carbohydrates, 5:30 PM 1 unit every 8 g carbohydrate. Sensitivity - 50  Blood glucose target range - 110     Most recent hemoglobin A1c completed November 9, 2023 was 5.7. He denies any polyuria, polydipsia, nocturia and blurry vision. He denies neuropathy, nephropathy and retinopathy. Overall doing well today without any concerns or questions. Has noticed better blood sugars overnight. Has not had any concerns with hypoglycemia or    Recent diabetic foot exam was performed in office visit on February 10, 2023. Hypoglycemic episodes: No, rare. Does have intranasal glucagon in case of severe hypoglycemia. Blood Sugar/Glucometer/Pump/CGM review: Download of Dexcom from November 4 through November 17, 2023 reveals an average glucose level of 136 with a standard deviation of 38.   He is in target range 87% of the time, below target range 1% of time, and above target range 12% of the time. All in all glucose levels glucose levels are doing well throughout the day with the rare episodes of postprandial hyperglycemia. Most recent 25 hydroxy vitamin-D value from February 3, 2023 is 39.0. He is currently supplementing with 5000 units of vitamin-D. Review of Systems   Constitutional:  Negative for activity change, appetite change, fatigue and unexpected weight change. HENT:  Negative for trouble swallowing. Eyes:  Negative for visual disturbance. Respiratory:  Negative for chest tightness and shortness of breath. Cardiovascular:  Negative for chest pain, palpitations and leg swelling. Gastrointestinal:  Negative for abdominal pain, diarrhea, nausea and vomiting. Endocrine: Negative for cold intolerance, heat intolerance, polydipsia, polyphagia and polyuria. Genitourinary:  Negative for frequency. Skin:  Negative for rash and wound. Neurological:  Negative for dizziness, weakness, light-headedness, numbness and headaches. Psychiatric/Behavioral:  Negative for dysphoric mood and sleep disturbance. The patient is not nervous/anxious. Historical Information   History reviewed. No pertinent past medical history. History reviewed. No pertinent surgical history.   Social History   Social History     Substance and Sexual Activity   Alcohol Use Not Currently   • Alcohol/week: 2.0 standard drinks of alcohol   • Types: 2 Cans of beer per week    Comment: some beer with dinner      Social History     Substance and Sexual Activity   Drug Use Never     Social History     Tobacco Use   Smoking Status Never   Smokeless Tobacco Never     Family History:   Family History   Problem Relation Age of Onset   • Migraines Mother    • Breast cancer Mother    • Heart disease Father    • Addiction problem Father    • Diabetes unspecified Father    • Pancreatitis Father    • No Known Problems Sister    • Heart disease Maternal Grandmother    • Heart disease Maternal Grandfather    • Prostate cancer Maternal Grandfather        Meds/Allergies   Current Outpatient Medications   Medication Sig Dispense Refill   • Accu-Chek FastClix Lancets MISC 4 times daily 100 each 3   • acetone, urine, test (Ketostix) strip Use prn hyperglycemia 25 each 0   • amphetamine-dextroamphetamine (ADDERALL) 20 mg tablet Take 20 mg by mouth 2 (two) times a day     • Cholecalciferol (Vitamin D3) 125 MCG (5000 UT) CAPS take 1 capsule by mouth once daily 30 capsule 5   • Continuous Blood Gluc  (Dexcom G7 ) IRMA Use 1 Device 4 (four) times a day (before meals and at bedtime) 1 each 0   • Continuous Blood Gluc Sensor (Dexcom G7 Sensor) Use 1 Device continuous Replace sensor every 10 day 9 each 3   • Glucagon (Baqsimi One Pack) 3 MG/DOSE POWD 3 mg prn severe hypoglycemia 2 each 1   • glucose blood (FREESTYLE LITE) test strip Check blood sugar 4 times daily 200 strip 6   • hydrOXYzine HCL (ATARAX) 25 mg tablet 1 TO 2 AT BEDTIME     • Insulin Disposable Pump (Omnipod DASH Intro, Gen 4,) KIT Change every 3 days. 1 kit 0   • Insulin Disposable Pump (Omnipod DASH Pods, Gen 4,) MISC Change every 72 hours. 6 each 6   • insulin lispro (HumaLOG) 100 units/mL injection Use up to 50 units daily via insulin pump.  50 mL 1   • insulin lispro (HumaLOG) 100 units/mL injection Inject 65 unit daily via insulin pump 60 mL 1   • multivitamin (THERAGRAN) TABS Take 1 tablet by mouth if needed     • venlafaxine (EFFEXOR-XR) 75 mg 24 hr capsule Take 150 mg by mouth daily     • amphetamine-dextroamphetamine (ADDERALL XR) 20 MG 24 hr capsule Take 20 mg by mouth daily (Patient not taking: Reported on 5/5/2023)     • amphetamine-dextroamphetamine (ADDERALL) 15 MG tablet Take 1 tablet by mouth 2 (two) times a day (Patient not taking: Reported on 8/11/2023)     • Blood Glucose Monitoring Suppl (FreeStyle Lite) w/Device KIT Check blood sugars 4 times daily 1 kit 0   • Continuous Blood Gluc Transmit (Dexcom G6 Transmitter) MISC q 90 days (Patient not taking: Reported on 8/11/2023) 1 each 0   • escitalopram (LEXAPRO) 20 mg tablet Take 20 mg by mouth daily (Patient not taking: Reported on 8/11/2023)     • FLUoxetine (PROzac) 40 MG capsule Take 80 mg by mouth daily (Patient not taking: Reported on 11/17/2023)     • insulin glargine (Basaglar KwikPen) 100 units/mL injection pen 18 units daily (Patient not taking: Reported on 11/4/2022) 15 mL 2   • Insulin Pen Needle (BD Pen Needle Ibis U/F) 32G X 4 MM MISC 4 times daily (Patient not taking: Reported on 11/17/2023) 400 each 3   • mirtazapine (REMERON) 15 mg tablet Take 15 mg by mouth daily at bedtime (Patient not taking: Reported on 5/5/2023)     • traZODone (DESYREL) 50 mg tablet 50 mg if needed (Patient not taking: Reported on 11/17/2023)       No current facility-administered medications for this visit. Allergies   Allergen Reactions   • Bupropion Rash       Objective   Vitals: Blood pressure 118/72, pulse (!) 108, height 6' (1.829 m), weight 70.3 kg (155 lb), SpO2 97 %. Physical Exam  Vitals and nursing note reviewed. Constitutional:       General: He is not in acute distress. Appearance: Normal appearance. He is not diaphoretic. HENT:      Head: Normocephalic and atraumatic. Eyes:      General: No scleral icterus. Extraocular Movements: Extraocular movements intact. Conjunctiva/sclera: Conjunctivae normal.      Pupils: Pupils are equal, round, and reactive to light. Cardiovascular:      Rate and Rhythm: Normal rate and regular rhythm. Heart sounds: No murmur heard. Pulmonary:      Effort: Pulmonary effort is normal. No respiratory distress. Breath sounds: Normal breath sounds. No wheezing. Musculoskeletal:      Cervical back: Normal range of motion. Right lower leg: No edema. Left lower leg: No edema. Lymphadenopathy:      Cervical: No cervical adenopathy. Skin:     General: Skin is warm and dry.    Neurological:      Mental Status: He is alert and oriented to person, place, and time. Mental status is at baseline. Sensory: No sensory deficit. Gait: Gait normal.   Psychiatric:         Mood and Affect: Mood normal.         Behavior: Behavior normal.         Thought Content: Thought content normal.         The history was obtained from the review of the chart, patient.     Lab Results:   Lab Results   Component Value Date/Time    Hemoglobin A1C 5.7 (H) 11/09/2023 07:45 AM    Hemoglobin A1C 6.0 (H) 08/02/2023 07:31 AM    Hemoglobin A1C 6.2 (H) 05/03/2023 08:11 AM    White Blood Cell Count 4.1 11/09/2023 07:45 AM    White Blood Cell Count 6.9 08/02/2023 07:31 AM    White Blood Cell Count 6.5 05/03/2023 08:11 AM    Hemoglobin 16.6 11/09/2023 07:45 AM    Hemoglobin 15.0 08/02/2023 07:31 AM    Hemoglobin 14.9 05/03/2023 08:11 AM    HCT 49.1 11/09/2023 07:45 AM    HCT 44.6 08/02/2023 07:31 AM    HCT 43.4 05/03/2023 08:11 AM    MCV 86 11/09/2023 07:45 AM    MCV 86 08/02/2023 07:31 AM    MCV 84 05/03/2023 08:11 AM    Platelet Count 750 24/82/8234 07:45 AM    Platelet Count 480 52/00/8788 07:31 AM    Platelet Count 956 71/42/3270 08:11 AM    BUN 19 11/09/2023 07:45 AM    BUN 17 08/02/2023 07:31 AM    BUN 19 05/03/2023 08:11 AM    Potassium 4.9 11/09/2023 07:45 AM    Potassium 4.4 08/02/2023 07:31 AM    Potassium 4.3 05/03/2023 08:11 AM    Chloride 100 11/09/2023 07:45 AM    Chloride 100 08/02/2023 07:31 AM    Chloride 102 05/03/2023 08:11 AM    CO2 25 11/09/2023 07:45 AM    CO2 25 08/02/2023 07:31 AM    CO2 26 05/03/2023 08:11 AM    Creatinine 1.02 11/09/2023 07:45 AM    Creatinine 0.91 08/02/2023 07:31 AM    Creatinine 0.99 05/03/2023 08:11 AM    AST 14 11/09/2023 07:45 AM    AST 12 08/02/2023 07:31 AM    AST 14 05/03/2023 08:11 AM    ALT 13 11/09/2023 07:45 AM    ALT 12 08/02/2023 07:31 AM    ALT 17 05/03/2023 08:11 AM    Protein, Total 7.2 11/09/2023 07:45 AM    Protein, Total 6.8 08/02/2023 07:31 AM    Protein, Total 6.7 05/03/2023 08:11 AM    Albumin 4.9 11/09/2023 07:45 AM    Albumin 4.6 08/02/2023 07:31 AM    Albumin 4.5 05/03/2023 08:11 AM    Globulin, Total 2.3 11/09/2023 07:45 AM    Globulin, Total 2.2 08/02/2023 07:31 AM    Globulin, Total 2.2 05/03/2023 08:11 AM    HDL 35 (L) 11/09/2023 07:45 AM    HDL 36 (L) 05/03/2023 08:11 AM    Triglycerides 97 11/09/2023 07:45 AM    Triglycerides 120 05/03/2023 08:11 AM       Portions of the record may have been created with voice recognition software. Occasional wrong word or "sound a like" substitutions may have occurred due to the inherent limitations of voice recognition software. Read the chart carefully and recognize, using context, where substitutions have occurred.

## 2023-11-18 LAB
DME PARACHUTE DELIVERY DATE REQUESTED: NORMAL
DME PARACHUTE ITEM DESCRIPTION: NORMAL
DME PARACHUTE ITEM DESCRIPTION: NORMAL
DME PARACHUTE ORDER STATUS: NORMAL
DME PARACHUTE SUPPLIER NAME: NORMAL
DME PARACHUTE SUPPLIER PHONE: NORMAL

## 2023-11-29 ENCOUNTER — TELEPHONE (OUTPATIENT)
Dept: ENDOCRINOLOGY | Facility: HOSPITAL | Age: 23
End: 2023-11-29

## 2023-11-29 NOTE — TELEPHONE ENCOUNTER
I called [facility] to try to do a prior auth for his pods through a DME. I was transferred to multiple people before I got someone that could help. I tried to initiate the prior auth but they need the billing code for the pods which I don't have. I did email the Omnipod rep to see if she knows what that would be. I called his mom to let her know I'm having issues and she said she was told I have to submitt the order to [facility] which I did. Phone number to call back to do the auth for the pods if we get the billing code is [phone]. His mom is going to try and call insurance to try to get help with this. I'm hoping that Mikel can help with the prior auth as well if need be.

## 2023-12-01 NOTE — TELEPHONE ENCOUNTER
Received prior auth approval. I sent the approval to [facility] through [place]. [id number].

## 2023-12-01 NOTE — TELEPHONE ENCOUNTER
Received prior auth request to be completed on cover my meds so he can get his pods through [place].     Key: [id number]

## 2023-12-08 NOTE — TELEPHONE ENCOUNTER
Per Mikel through [employer], The insurance is saying the prior authorization is still required. Please make sure this prior auth was approved for the pharmacy. I left message for his mom to call back in regards to this.

## 2023-12-13 ENCOUNTER — TELEPHONE (OUTPATIENT)
Dept: ENDOCRINOLOGY | Facility: HOSPITAL | Age: 23
End: 2023-12-13

## 2023-12-13 NOTE — TELEPHONE ENCOUNTER
Appeal letter was written and signed by the provider.   It was faxed to [employer] via number [phone]

## 2023-12-14 ENCOUNTER — TELEPHONE (OUTPATIENT)
Dept: ENDOCRINOLOGY | Facility: HOSPITAL | Age: 23
End: 2023-12-14

## 2023-12-14 NOTE — TELEPHONE ENCOUNTER
Paperwork is being sent over from [facility] for the provider to review and sign.   It was reviewed and signed and faxed back to number [phone]

## 2024-02-20 LAB
ALBUMIN SERPL-MCNC: 5.1 G/DL (ref 4.3–5.2)
ALBUMIN/GLOB SERPL: 2.1 {RATIO} (ref 1.2–2.2)
ALP SERPL-CCNC: 85 IU/L (ref 44–121)
ALT SERPL-CCNC: 18 IU/L (ref 0–44)
AST SERPL-CCNC: 13 IU/L (ref 0–40)
BILIRUB SERPL-MCNC: 0.9 MG/DL (ref 0–1.2)
BUN SERPL-MCNC: 19 MG/DL (ref 6–20)
BUN/CREAT SERPL: 20 (ref 9–20)
CALCIUM SERPL-MCNC: 10 MG/DL (ref 8.7–10.2)
CHLORIDE SERPL-SCNC: 99 MMOL/L (ref 96–106)
CO2 SERPL-SCNC: 22 MMOL/L (ref 20–29)
CREAT SERPL-MCNC: 0.93 MG/DL (ref 0.76–1.27)
EGFR: 118 ML/MIN/1.73
GLOBULIN SER-MCNC: 2.4 G/DL (ref 1.5–4.5)
GLUCOSE SERPL-MCNC: 112 MG/DL (ref 70–99)
HBA1C MFR BLD: 5.8 % (ref 4.8–5.6)
POTASSIUM SERPL-SCNC: 4.3 MMOL/L (ref 3.5–5.2)
PROT SERPL-MCNC: 7.5 G/DL (ref 6–8.5)
SODIUM SERPL-SCNC: 140 MMOL/L (ref 134–144)

## 2024-02-23 ENCOUNTER — OFFICE VISIT (OUTPATIENT)
Dept: ENDOCRINOLOGY | Facility: HOSPITAL | Age: 24
End: 2024-02-23
Payer: COMMERCIAL

## 2024-02-23 VITALS
DIASTOLIC BLOOD PRESSURE: 60 MMHG | SYSTOLIC BLOOD PRESSURE: 98 MMHG | OXYGEN SATURATION: 98 % | HEART RATE: 136 BPM | BODY MASS INDEX: 18.42 KG/M2 | HEIGHT: 72 IN | WEIGHT: 136 LBS

## 2024-02-23 DIAGNOSIS — E10.9 TYPE 1 DIABETES MELLITUS WITHOUT COMPLICATION (HCC): Primary | ICD-10-CM

## 2024-02-23 DIAGNOSIS — R73.9 HYPERGLYCEMIA: ICD-10-CM

## 2024-02-23 DIAGNOSIS — E55.9 VITAMIN D DEFICIENCY: ICD-10-CM

## 2024-02-23 PROCEDURE — 95251 CONT GLUC MNTR ANALYSIS I&R: CPT | Performed by: NURSE PRACTITIONER

## 2024-02-23 PROCEDURE — 99214 OFFICE O/P EST MOD 30 MIN: CPT | Performed by: NURSE PRACTITIONER

## 2024-02-23 RX ORDER — INSULIN LISPRO 100 [IU]/ML
INJECTION, SOLUTION INTRAVENOUS; SUBCUTANEOUS
Qty: 60 ML | Refills: 3 | Status: SHIPPED | OUTPATIENT
Start: 2024-02-23

## 2024-02-23 RX ORDER — LEVOMEFOLATE CALCIUM 15 MG
TABLET ORAL
COMMUNITY

## 2024-02-23 NOTE — PROGRESS NOTES
Elroy Salcido 23 y.o. male MRN: [id number]    Encounter: [id number]      Assessment/Plan     Assessment:  This is a 23 y.o.-year-old male with  type 1 diabetes with hyperglycemia and vitamin-D deficiency.     Plan:  1. Type 1 diabetes:  His most recent hemoglobin A1c is 5.8.  Review of his Dexcom continuous glucose monitor and his Omnipod DASH pump download, he is relatively well controlled. Otherwise, his insulin to carbohydrate ratios remain the same. Reviewed recognition and proper treatment of hypoglycemic episodes.  He will contact the office with any problems.  He is having some anxiety related to eating and the resulting blood sugars.  I have asked him to follow-up with medical nutrition therapy for some help with his diet.  Check hemoglobin A1c and comprehensive metabolic panel prior to next visit.     2.  Vitamin-D deficiency:  I have asked him to continue supplementation with 5000 units of vitamin D3 daily.  Will continue to monitor level.    CC: Type 1 Diabetes follow up    History of Present Illness     HPI:  23 y.o. year old male with type 1 diabetes for approximately 2 years.  He is on insulin and Omnipod DASH with a Dexcom continuous glucose monitor.      His pump settings are as follows:  Basal  Midnight - 0.65  Insulin to carbohydrate ratio  Midnight - 1:9  Sensitivity - 50  Blood glucose target range - 110     Most recent hemoglobin A1c from February 19, 2024 is 5.8. He denies any polyuria, polydipsia, nocturia and blurry vision. He denies neuropathy, nephropathy and retinopathy.  Recent diabetic foot exam was performed in office visit on February 10, 2023.     Hypoglycemic episodes: No, rare. Does have intranasal glucagon in case of severe hypoglycemia.     Blood Sugar/Glucometer/Pump/CGM review: OmniPod DASH pump and Dexcom CGM download from November 23, 2023 through February 23, 2024 visit in target range of 86% with 14% elevated readings and less than 1% low readings.  He is utilizing  24.7 units of insulin per day 48.1% of which is bolus and 51.9% is basal.     For his vitamin D deficiency, he is currently supplementing with 5000 units of vitamin-D.     Review of Systems   Constitutional: Negative.  Negative for chills, fatigue and fever.   HENT: Negative.  Negative for trouble swallowing and voice change.    Eyes:  Negative for photophobia, pain, discharge, redness, itching and visual disturbance.   Respiratory:  Negative for cough and shortness of breath.    Cardiovascular:  Negative for chest pain and palpitations.   Gastrointestinal:  Negative for abdominal pain, constipation, diarrhea, nausea and vomiting.   Endocrine: Negative for cold intolerance, heat intolerance, polydipsia, polyphagia and polyuria.   Genitourinary: Negative.    Musculoskeletal: Negative.    Skin: Negative.    Allergic/Immunologic: Negative.    Neurological:  Negative for dizziness, syncope, light-headedness and headaches.   Hematological: Negative.    Psychiatric/Behavioral: Negative.     All other systems reviewed and are negative.      Historical Information   History reviewed. No pertinent past medical history.  History reviewed. No pertinent surgical history.  Social History   Social History     Substance and Sexual Activity   Alcohol Use Not Currently    Alcohol/week: 2.0 standard drinks of alcohol    Types: 2 Cans of beer per week    Comment: some beer with dinner      Social History     Substance and Sexual Activity   Drug Use Never     Social History     Tobacco Use   Smoking Status Never   Smokeless Tobacco Never     Family History:   Family History   Problem Relation Age of Onset    Migraines Mother     Breast cancer Mother     Heart disease Father     Addiction problem Father     Diabetes unspecified Father     Pancreatitis Father     No Known Problems Sister     Heart disease Maternal Grandmother     Heart disease Maternal Grandfather     Prostate cancer Maternal Grandfather        Meds/Allergies   Current  Outpatient Medications   Medication Sig Dispense Refill    Accu-Chek FastClix Lancets MISC 4 times daily 100 each 3    acetone, urine, test (Ketostix) strip Use prn hyperglycemia 25 each 0    amphetamine-dextroamphetamine (ADDERALL) 20 mg tablet Take 20 mg by mouth 2 (two) times a day      Blood Glucose Monitoring Suppl (FreeStyle Lite) w/Device KIT Check blood sugars 4 times daily 1 kit 0    Continuous Blood Gluc  (Dexcom G7 ) IRMA Use 1 Device 4 (four) times a day (before meals and at bedtime) 1 each 0    Continuous Blood Gluc Sensor (Dexcom G7 Sensor) Use 1 Device continuous Replace sensor every 10 day 9 each 3    Glucagon (Baqsimi One Pack) 3 MG/DOSE POWD 3 mg prn severe hypoglycemia 2 each 1    glucose blood (FREESTYLE LITE) test strip Check blood sugar 4 times daily 200 strip 6    hydrOXYzine HCL (ATARAX) 25 mg tablet 1 TO 2 AT BEDTIME      Insulin Disposable Pump (Omnipod DASH Intro, Gen 4,) KIT Change every 3 days. 1 kit 0    Insulin Disposable Pump (Omnipod DASH Pods, Gen 4,) MISC Change every 72 hours. 6 each 6    insulin lispro (HumaLOG) 100 units/mL injection Use up to 50 units daily via insulin pump. 50 mL 1    insulin lispro (HumaLOG) 100 units/mL injection Inject 65 unit daily via insulin pump 60 mL 1    L-Methylfolate 15 MG TABS Take by mouth      venlafaxine (EFFEXOR-XR) 75 mg 24 hr capsule Take 225 mg by mouth daily      amphetamine-dextroamphetamine (ADDERALL XR) 20 MG 24 hr capsule Take 20 mg by mouth daily (Patient not taking: Reported on 5/5/2023)      amphetamine-dextroamphetamine (ADDERALL) 15 MG tablet Take 1 tablet by mouth 2 (two) times a day (Patient not taking: Reported on 8/11/2023)      Cholecalciferol (Vitamin D3) 125 MCG (5000 UT) CAPS take 1 capsule by mouth once daily (Patient not taking: Reported on 2/23/2024) 30 capsule 5    Continuous Blood Gluc Transmit (Dexcom G6 Transmitter) MISC q 90 days (Patient not taking: Reported on 8/11/2023) 1 each 0    escitalopram  (LEXAPRO) 20 mg tablet Take 20 mg by mouth daily (Patient not taking: Reported on 8/11/2023)      FLUoxetine (PROzac) 40 MG capsule Take 80 mg by mouth daily (Patient not taking: Reported on 11/17/2023)      insulin glargine (Basaglar KwikPen) 100 units/mL injection pen 18 units daily (Patient not taking: Reported on 11/4/2022) 15 mL 2    Insulin Pen Needle (BD Pen Needle Ibis U/F) 32G X 4 MM MISC 4 times daily (Patient not taking: Reported on 2/23/2024) 400 each 3    mirtazapine (REMERON) 15 mg tablet Take 15 mg by mouth daily at bedtime (Patient not taking: Reported on 5/5/2023)      multivitamin (THERAGRAN) TABS Take 1 tablet by mouth if needed (Patient not taking: Reported on 2/23/2024)      traZODone (DESYREL) 50 mg tablet 50 mg if needed (Patient not taking: Reported on 11/17/2023)       No current facility-administered medications for this visit.     Allergies   Allergen Reactions    Bupropion Rash       Objective   Vitals: Blood pressure 98/60, pulse (!) 136, height 6' (1.829 m), weight 61.7 kg (136 lb), SpO2 98%.    Physical Exam  Vitals reviewed.   Constitutional:       Appearance: He is well-developed.      Comments: Thin build   HENT:      Head: Normocephalic and atraumatic.      Nose: Nose normal.   Eyes:      Conjunctiva/sclera: Conjunctivae normal.      Pupils: Pupils are equal, round, and reactive to light.   Cardiovascular:      Rate and Rhythm: Normal rate and regular rhythm.      Pulses: no weak pulses.           Dorsalis pedis pulses are 1+ on the right side and 1+ on the left side.        Posterior tibial pulses are 1+ on the right side and 1+ on the left side.      Heart sounds: Normal heart sounds.   Pulmonary:      Effort: Pulmonary effort is normal.      Breath sounds: Normal breath sounds.   Abdominal:      General: Bowel sounds are normal.      Palpations: Abdomen is soft.   Musculoskeletal:         General: Normal range of motion.      Cervical back: Normal range of motion and neck  supple.   Feet:      Right foot:      Skin integrity: No ulcer, skin breakdown, erythema, warmth, callus or dry skin.      Left foot:      Skin integrity: No ulcer, skin breakdown, erythema, warmth, callus or dry skin.   Skin:     General: Skin is warm and dry.   Neurological:      Mental Status: He is alert and oriented to person, place, and time.   Psychiatric:         Behavior: Behavior normal.         Thought Content: Thought content normal.         Judgment: Judgment normal.       Patient's shoes and socks removed.    Right Foot/Ankle   Right Foot Inspection  Skin Exam: skin normal and skin intact. No dry skin, no warmth, no callus, no erythema, no maceration, no abnormal color, no pre-ulcer, no ulcer and no callus.     Toe Exam: ROM and strength within normal limits.     Sensory   Monofilament testing: intact    Vascular  Capillary refills: < 3 seconds  The right DP pulse is 1+. The right PT pulse is 1+.     Left Foot/Ankle  Left Foot Inspection  Skin Exam: skin normal and skin intact. No dry skin, no warmth, no erythema, no maceration, normal color, no pre-ulcer, no ulcer and no callus.     Toe Exam: ROM and strength within normal limits.     Sensory   Monofilament testing: intact    Vascular  Capillary refills: < 3 seconds  The left DP pulse is 1+. The left PT pulse is 1+.     Assign Risk Category  No deformity present  No loss of protective sensation  No weak pulses  Risk: 0    Lab Results:   Lab Results   Component Value Date/Time    Hemoglobin A1C 5.8 (H) 02/19/2024 10:19 AM    Hemoglobin A1C 5.7 (H) 11/09/2023 07:45 AM    Hemoglobin A1C 6.0 (H) 08/02/2023 07:31 AM    White Blood Cell Count 4.1 11/09/2023 07:45 AM    White Blood Cell Count 6.9 08/02/2023 07:31 AM    White Blood Cell Count 6.5 05/03/2023 08:11 AM    Hemoglobin 16.6 11/09/2023 07:45 AM    Hemoglobin 15.0 08/02/2023 07:31 AM    Hemoglobin 14.9 05/03/2023 08:11 AM    HCT 49.1 11/09/2023 07:45 AM    HCT 44.6 08/02/2023 07:31 AM    HCT 43.4  "05/03/2023 08:11 AM    MCV 86 11/09/2023 07:45 AM    MCV 86 08/02/2023 07:31 AM    MCV 84 05/03/2023 08:11 AM    Platelet Count 221 11/09/2023 07:45 AM    Platelet Count 211 08/02/2023 07:31 AM    Platelet Count 214 05/03/2023 08:11 AM    BUN 19 02/19/2024 10:19 AM    BUN 19 11/09/2023 07:45 AM    BUN 17 08/02/2023 07:31 AM    Potassium 4.3 02/19/2024 10:19 AM    Potassium 4.9 11/09/2023 07:45 AM    Potassium 4.4 08/02/2023 07:31 AM    Chloride 99 02/19/2024 10:19 AM    Chloride 100 11/09/2023 07:45 AM    Chloride 100 08/02/2023 07:31 AM    CO2 22 02/19/2024 10:19 AM    CO2 25 11/09/2023 07:45 AM    CO2 25 08/02/2023 07:31 AM    Creatinine 0.93 02/19/2024 10:19 AM    Creatinine 1.02 11/09/2023 07:45 AM    Creatinine 0.91 08/02/2023 07:31 AM    AST 13 02/19/2024 10:19 AM    AST 14 11/09/2023 07:45 AM    AST 12 08/02/2023 07:31 AM    ALT 18 02/19/2024 10:19 AM    ALT 13 11/09/2023 07:45 AM    ALT 12 08/02/2023 07:31 AM    Protein, Total 7.5 02/19/2024 10:19 AM    Protein, Total 7.2 11/09/2023 07:45 AM    Protein, Total 6.8 08/02/2023 07:31 AM    Albumin 5.1 02/19/2024 10:19 AM    Albumin 4.9 11/09/2023 07:45 AM    Albumin 4.6 08/02/2023 07:31 AM    Globulin, Total 2.4 02/19/2024 10:19 AM    Globulin, Total 2.3 11/09/2023 07:45 AM    Globulin, Total 2.2 08/02/2023 07:31 AM    HDL 35 (L) 11/09/2023 07:45 AM    HDL 36 (L) 05/03/2023 08:11 AM    Triglycerides 97 11/09/2023 07:45 AM    Triglycerides 120 05/03/2023 08:11 AM       Portions of the record may have been created with voice recognition software. Occasional wrong word or \"sound a like\" substitutions may have occurred due to the inherent limitations of voice recognition software. Read the chart carefully and recognize, using context, where substitutions have occurred.    "

## 2024-02-23 NOTE — PATIENT INSTRUCTIONS
Stay active and stay hydrated.     You Hemoglobin A1c is 5.8.     Keep up the good work !!!!!!!!!     Continue to utilize the OmniPod and the Dexcom continuous glucose monitor.     Continue 5000 units of Vitamin D 3 daily.

## 2024-03-08 ENCOUNTER — HOSPITAL ENCOUNTER (OUTPATIENT)
Dept: HOSPITAL 99 - HWRAD | Age: 24
End: 2024-03-08
Payer: COMMERCIAL

## 2024-03-08 DIAGNOSIS — R11.0: Primary | ICD-10-CM

## 2024-03-13 ENCOUNTER — TELEMEDICINE (OUTPATIENT)
Dept: DIABETES SERVICES | Facility: HOSPITAL | Age: 24
End: 2024-03-13
Payer: COMMERCIAL

## 2024-03-13 VITALS — WEIGHT: 136 LBS | BODY MASS INDEX: 19.47 KG/M2 | HEIGHT: 70 IN

## 2024-03-13 DIAGNOSIS — E10.9 TYPE 1 DIABETES MELLITUS WITHOUT COMPLICATION (HCC): Primary | ICD-10-CM

## 2024-03-13 PROCEDURE — 97802 MEDICAL NUTRITION INDIV IN: CPT | Performed by: DIETITIAN, REGISTERED

## 2024-03-13 NOTE — PROGRESS NOTES
"Medical Nutrition Therapy     Assessment    Visit Type: Initial visit  Chief complaint:  Type 1 Diabetes    HPI: Met with Elroy for initial medical nutrition therapy.  We previously met together a few years ago.  Newly diagnosed with type 1 diabetes a couple years ago.  He has had a variety of physical challenges over the last years, including processing through the diagnosis of diabetes.  Meeting today to discuss his current eating issues.  He has developed significant taste changes, texture issues, and food aversions.  He eats very little as a result of it.  Spent time discussing there are 2 pieces to this.  The physical piece of needing to just increase caloric intake in any way possible.  We discussed the use of unflavored protein powder to add to his shakes or Jell-O and pudding and anything that he can sneak it into.  Also the use of powdered milk to increase calories.  Discussed increase of fats to increase calories as well, he has recently been eating nuts and doing okay with that, also trying peanut butter and other butter alternatives as calorie dense foods as well.  The hope is by increasing his overall caloric intake that will help to increase his weight, nutritional status, and overall strength.  Spent some time discussing the mental and emotional piece of food aversion as well.  That is not my area of expertise.  He has not met with anyone previously that has specialized in food aversion therapies. I will look into some options and recommendations for feeding clinics that might be able to offer therapies and support for him. The [facility] might be a good place to start.  No f/u needed.     Ht Readings from Last 1 Encounters:   03/13/24 5' 10\" (1.778 m)     Wt Readings from Last 3 Encounters:   03/13/24 61.7 kg (136 lb)   02/23/24 61.7 kg (136 lb)   11/17/23 70.3 kg (155 lb)        Body mass index is 19.51 kg/m².     Lab Results   Component Value Date    HGBA1C 5.8 (H) 02/19/2024    HGBA1C " "5.7 (H) 11/09/2023    HGBA1C 6.0 (H) 08/02/2023       No results found for: \"CHOL\"  Lab Results   Component Value Date    HDL 35 (L) 11/09/2023    HDL 36 (L) 05/03/2023    HDL 36 (L) 10/20/2022     Lab Results   Component Value Date    LDLCALC 187 (H) 11/09/2023    LDLCALC 152 (H) 05/03/2023    LDLCALC 121 (H) 10/20/2022     Lab Results   Component Value Date    TRIG 97 11/09/2023    TRIG 120 05/03/2023    TRIG 56 10/20/2022     Lab Results   Component Value Date    CHOLHDL 8.7 (H) 04/11/2022       Weight Change: No    Medical Diagnosis/ICD 10 Code:  E10.9    Barriers to Learning: no barriers    Do you follow any special diet presently?: No  Who shops: patient and mother  Who cooks: patient and mother    Food Log: Completed via the method of food recall-     Breakfast: up around 7/8, takes a few hours in bed to wake up. Protein shake, 10-11am. Doesn't bolus.   Morning Snack:  Lunch: bar or shake around 1pm. Does bolus for the bar 28g.   Afternoon Snack:  trying to eat more and be awake. Mom brings food around 3-4pm, grilled cheese, yogurt, cheese and crackers, nuts   Dinner:7ish: dinner can vary a lot, doesn't eat it and will have another grilled cheese, steak works, chicken no, sweet potatoes, ok with broccoli  Evening Snack: marva nuggets that are a few carbs. Bed 10pm.   Beverages: water with gatorade zero, coffee in the morning with mom but upsets the stomach, no tea, no milk, juice v8 spash regular small glass, no soda.   Eating out/Take out:not much   Exercise ADL,     Nutrition Diagnosis:  Food and nutrition related knowledge deficit  related to Lack of prior exposure to accurate nutrition related information as evidenced by Verbalizes inaccurate or incomplete information    Intervention: plate method, label reading, behavior modification strategies, carbohydrate counting, meal planning, individualized meal plan, and monitoring portion control     Treatment Goals: Patient understands education and " recommendations    Education Material Given  Elroy was provided the Portion Booklet and Planning Healthy Meals     Monitoring and evaluation:    Term code indicator  FH 1.6.3 Carbohydrate Intake Criteria: 30-45g CHO per meal, 15g CHO snacks    Patient’s Response to Instruction:  Comprehensiongood  Motivationgood  Expected Compliancegood    Thank you for coming to the [facility] [place] for education today.  Please feel free to call with any questions or concerns.    Mamta Huynh RD  [address] [place] [id number]        Virtual Regular Visit    Verification of patient location:    Patient is located at Home in the following state in which I hold an active license [place]      Assessment/Plan:    Problem List Items Addressed This Visit       Type 1 diabetes mellitus without complication (HCC) - Primary            Reason for visit is   Chief Complaint   Patient presents with    Diabetes Type 1    Virtual Regular Visit          Encounter provider Mamta Huynh RD    Provider located at [address] [id number]      Recent Visits  Date Type Provider Dept   03/13/24 Telemedicine Mamta Huynh RD [facility]   Showing recent visits within past 7 days and meeting all other requirements  Future Appointments  No visits were found meeting these conditions.  Showing future appointments within next 150 days and meeting all other requirements       The patient was identified by name and date of birth. Elroy Salcido was informed that this is a telemedicine visit and that the visit is being conducted through the [employer] platform. He agrees to proceed..  My office door was closed. No one else was in the room.  He acknowledged consent and understanding of privacy and security of the video platform. The patient has agreed to participate and understands they can discontinue the  visit at any time.    Patient is aware this is a billable service.     Yoshi Salcido is a 23 y.o. male see notes above .      HPI     No past medical history on file.    No past surgical history on file.    Current Outpatient Medications   Medication Sig Dispense Refill    Continuous Blood Gluc Sensor (Dexcom G7 Sensor) Use 1 Device continuous Replace sensor every 10 day 9 each 3    Glucagon (Baqsimi One Pack) 3 MG/DOSE POWD 3 mg prn severe hypoglycemia 2 each 1    glucose blood (FREESTYLE LITE) test strip Check blood sugar 4 times daily 200 strip 6    Insulin Disposable Pump (Omnipod DASH Pods, Gen 4,) MISC Change every 72 hours. 6 each 6    insulin lispro (HumaLOG) 100 units/mL injection Inject 65 unit daily via insulin pump 60 mL 3    Accu-Chek FastClix Lancets MISC 4 times daily 100 each 3    acetone, urine, test (Ketostix) strip Use prn hyperglycemia 25 each 0    amphetamine-dextroamphetamine (ADDERALL XR) 20 MG 24 hr capsule Take 20 mg by mouth daily (Patient not taking: Reported on 5/5/2023)      amphetamine-dextroamphetamine (ADDERALL) 15 MG tablet Take 1 tablet by mouth 2 (two) times a day (Patient not taking: Reported on 8/11/2023)      amphetamine-dextroamphetamine (ADDERALL) 20 mg tablet Take 20 mg by mouth 2 (two) times a day      Blood Glucose Monitoring Suppl (FreeStyle Lite) w/Device KIT Check blood sugars 4 times daily 1 kit 0    Cholecalciferol (Vitamin D3) 125 MCG (5000 UT) CAPS take 1 capsule by mouth once daily (Patient not taking: Reported on 2/23/2024) 30 capsule 5    Continuous Blood Gluc  (Dexcom G7 ) IRMA Use 1 Device 4 (four) times a day (before meals and at bedtime) 1 each 0    Continuous Blood Gluc Transmit (Dexcom G6 Transmitter) MISC q 90 days (Patient not taking: Reported on 8/11/2023) 1 each 0    escitalopram (LEXAPRO) 20 mg tablet Take 20 mg by mouth daily (Patient not taking: Reported on 8/11/2023)      FLUoxetine (PROzac) 40 MG capsule Take 80 mg  "by mouth daily (Patient not taking: Reported on 11/17/2023)      hydrOXYzine HCL (ATARAX) 25 mg tablet 1 TO 2 AT BEDTIME      Insulin Disposable Pump (Omnipod DASH Intro, Gen 4,) KIT Change every 3 days. 1 kit 0    insulin glargine (Basaglar KwikPen) 100 units/mL injection pen 18 units daily (Patient not taking: Reported on 11/4/2022) 15 mL 2    insulin lispro (HumaLOG) 100 units/mL injection Use up to 50 units daily via insulin pump. 50 mL 1    Insulin Pen Needle (BD Pen Needle Ibis U/F) 32G X 4 MM MISC 4 times daily (Patient not taking: Reported on 2/23/2024) 400 each 3    L-Methylfolate 15 MG TABS Take by mouth      mirtazapine (REMERON) 15 mg tablet Take 15 mg by mouth daily at bedtime (Patient not taking: Reported on 5/5/2023)      multivitamin (THERAGRAN) TABS Take 1 tablet by mouth if needed (Patient not taking: Reported on 2/23/2024)      traZODone (DESYREL) 50 mg tablet 50 mg if needed (Patient not taking: Reported on 11/17/2023)      venlafaxine (EFFEXOR-XR) 75 mg 24 hr capsule Take 225 mg by mouth daily       No current facility-administered medications for this visit.        Allergies   Allergen Reactions    Bupropion Rash       Review of Systems    Video Exam    Vitals:    03/13/24 1505   Weight: 61.7 kg (136 lb)   Height: 5' 10\" (1.778 m)       Physical Exam     Visit Time  Total Visit Duration: 45min        "

## 2024-04-15 ENCOUNTER — TELEPHONE (OUTPATIENT)
Age: 24
End: 2024-04-15

## 2024-04-15 NOTE — TELEPHONE ENCOUNTER
Patient's mother states patient reports headaches from weaning off of venlafaxine. States she read he is not supposed to take Ibuprofen or naproxen with venlafaxine but also read he should not take Tylenol with diabetes. Patient's mother would like a message sent to Blake Young for advice on what is safe to take. Please advise, thank you.

## 2024-04-17 NOTE — TELEPHONE ENCOUNTER
Venlafaxine and Ibuprofen and increase the risk of bleeding.  In Elroy's case I do not know that this is a significant risk.  Will most likely only be on ibuprofen for a few days to treat his headaches.  Typically this is more of a warning for elderly patients who have a significant risk of bleeding.

## 2024-04-17 NOTE — TELEPHONE ENCOUNTER
Patient's mother states she did not hear back regarding message from the 15th. Please advise, thank you.

## 2024-04-18 NOTE — TELEPHONE ENCOUNTER
Spoke with patient's mother, she believes the patient will need to take something for the headaches for more than just a few days. She would like to clarify that the ibuprofen in the best choice for Elroy?

## 2024-05-30 LAB
25(OH)D3+25(OH)D2 SERPL-MCNC: 22.4 NG/ML (ref 30–100)
ALBUMIN SERPL-MCNC: 4.5 G/DL (ref 4.3–5.2)
ALBUMIN/GLOB SERPL: 1.9 {RATIO} (ref 1.2–2.2)
ALP SERPL-CCNC: 80 IU/L (ref 44–121)
ALT SERPL-CCNC: 13 IU/L (ref 0–44)
AST SERPL-CCNC: 12 IU/L (ref 0–40)
BASOPHILS # BLD AUTO: 0 X10E3/UL (ref 0–0.2)
BASOPHILS NFR BLD AUTO: 1 %
BILIRUB SERPL-MCNC: 0.8 MG/DL (ref 0–1.2)
BUN SERPL-MCNC: 20 MG/DL (ref 6–20)
BUN/CREAT SERPL: 21 (ref 9–20)
CALCIUM SERPL-MCNC: 9.2 MG/DL (ref 8.7–10.2)
CHLORIDE SERPL-SCNC: 101 MMOL/L (ref 96–106)
CHOLEST SERPL-MCNC: 200 MG/DL (ref 100–199)
CO2 SERPL-SCNC: 24 MMOL/L (ref 20–29)
CREAT SERPL-MCNC: 0.96 MG/DL (ref 0.76–1.27)
EGFR: 114 ML/MIN/1.73
EOSINOPHIL # BLD AUTO: 0.1 X10E3/UL (ref 0–0.4)
EOSINOPHIL NFR BLD AUTO: 2 %
ERYTHROCYTE [DISTWIDTH] IN BLOOD BY AUTOMATED COUNT: 12.7 % (ref 11.6–15.4)
GLOBULIN SER-MCNC: 2.4 G/DL (ref 1.5–4.5)
GLUCOSE SERPL-MCNC: 147 MG/DL (ref 70–99)
HBA1C MFR BLD: 5.9 % (ref 4.8–5.6)
HCT VFR BLD AUTO: 44 % (ref 37.5–51)
HDLC SERPL-MCNC: 45 MG/DL
HGB BLD-MCNC: 14.9 G/DL (ref 13–17.7)
IMM GRANULOCYTES # BLD: 0 X10E3/UL (ref 0–0.1)
IMM GRANULOCYTES NFR BLD: 0 %
LDLC SERPL CALC-MCNC: 137 MG/DL (ref 0–99)
LYMPHOCYTES # BLD AUTO: 2.4 X10E3/UL (ref 0.7–3.1)
LYMPHOCYTES NFR BLD AUTO: 54 %
MCH RBC QN AUTO: 29 PG (ref 26.6–33)
MCHC RBC AUTO-ENTMCNC: 33.9 G/DL (ref 31.5–35.7)
MCV RBC AUTO: 86 FL (ref 79–97)
MONOCYTES # BLD AUTO: 0.3 X10E3/UL (ref 0.1–0.9)
MONOCYTES NFR BLD AUTO: 8 %
NEUTROPHILS # BLD AUTO: 1.5 X10E3/UL (ref 1.4–7)
NEUTROPHILS NFR BLD AUTO: 35 %
PLATELET # BLD AUTO: 159 X10E3/UL (ref 150–450)
POTASSIUM SERPL-SCNC: 4.1 MMOL/L (ref 3.5–5.2)
PROT SERPL-MCNC: 6.9 G/DL (ref 6–8.5)
RBC # BLD AUTO: 5.13 X10E6/UL (ref 4.14–5.8)
SL AMB VLDL CHOLESTEROL CALC: 18 MG/DL (ref 5–40)
SODIUM SERPL-SCNC: 140 MMOL/L (ref 134–144)
TRIGL SERPL-MCNC: 101 MG/DL (ref 0–149)
TSH SERPL DL<=0.005 MIU/L-ACNC: 1.39 UIU/ML (ref 0.45–4.5)
WBC # BLD AUTO: 4.3 X10E3/UL (ref 3.4–10.8)

## 2024-05-31 ENCOUNTER — OFFICE VISIT (OUTPATIENT)
Dept: ENDOCRINOLOGY | Facility: HOSPITAL | Age: 24
End: 2024-05-31
Payer: COMMERCIAL

## 2024-05-31 ENCOUNTER — TELEPHONE (OUTPATIENT)
Dept: ENDOCRINOLOGY | Facility: HOSPITAL | Age: 24
End: 2024-05-31

## 2024-05-31 VITALS
WEIGHT: 135.6 LBS | DIASTOLIC BLOOD PRESSURE: 80 MMHG | BODY MASS INDEX: 19.41 KG/M2 | HEART RATE: 117 BPM | HEIGHT: 70 IN | SYSTOLIC BLOOD PRESSURE: 116 MMHG

## 2024-05-31 DIAGNOSIS — E10.9 TYPE 1 DIABETES MELLITUS WITHOUT COMPLICATION (HCC): Primary | ICD-10-CM

## 2024-05-31 DIAGNOSIS — R73.9 HYPERGLYCEMIA: ICD-10-CM

## 2024-05-31 DIAGNOSIS — E55.9 VITAMIN D DEFICIENCY: ICD-10-CM

## 2024-05-31 PROCEDURE — 99214 OFFICE O/P EST MOD 30 MIN: CPT | Performed by: NURSE PRACTITIONER

## 2024-05-31 PROCEDURE — 95251 CONT GLUC MNTR ANALYSIS I&R: CPT | Performed by: NURSE PRACTITIONER

## 2024-05-31 NOTE — PATIENT INSTRUCTIONS
Stay active and stay hydrated.     You Hemoglobin A1c is 5.9.     Keep up the good work !!!!!!!!!     Continue to utilize the OmniPod and the Dexcom continuous glucose monitor.     Restart supplementation with 2000 units of Vitamin D 3 daily. As discussed.

## 2024-05-31 NOTE — PROGRESS NOTES
Elroy Salcido 23 y.o. male MRN: [id number]    Encounter: [id number]      Assessment & Plan     Assessment:  This is a 23 y.o.-year-old male with type 1 diabetes with hyperglycemia and vitamin-D deficiency.      Plan:  1. Type 1 diabetes:  His most recent hemoglobin A1c is 5.9.  Review of his Dexcom continuous glucose monitor and his Omnipod DASH pump download, he is relatively well controlled. For now, his insulin pump settings will remain the same. Reviewed recognition and proper treatment of hypoglycemic episodes.  He will contact the office with any problems.  He is having some anxiety related to eating and the resulting blood sugars.  We have discussed a follow-up with medical nutrition therapy for some help with his diet.  Check hemoglobin A1c and comprehensive metabolic panel prior to next visit.     2.  Vitamin-D deficiency: Level is 22.4.  I have asked him to restart supplementation with 2000 units of vitamin D3 daily.  Will continue to monitor levels over time.    CC: Type 1 Diabetes follow up    History of Present Illness     HPI:  23 y.o. year old male with type 1 diabetes for approximately 3 years.  He is on insulin and Omnipod DASH with a Dexcom continuous glucose monitor.      His pump settings are as follows:  Basal  Midnight - 0.65  Insulin to carbohydrate ratio  Midnight - 1:9  Sensitivity - 50  Blood glucose target range - 110     Most recent hemoglobin A1c from May 29, 2024 is 5.9. He denies any polyuria, polydipsia, nocturia and blurry vision. He denies neuropathy, nephropathy and retinopathy.  Recent diabetic foot exam was performed in office visit on February 23, 2024.     Hypoglycemic episodes: No, rare. Does have intranasal glucagon in case of severe hypoglycemia.     Blood Sugar/Glucometer/Pump/CGM review: OmniPod DASH pump and Dexcom CGM download from May 18 through May 30, 2024 visit in target range of 86% with 14% elevated readings and less than 1% low readings.  He is utilizing  24.7 units of insulin per day 48.1% of which is bolus and 51.9% is basal.     For his vitamin D deficiency, he is not currently supplementing his vitamin D.  His most recent 25-hydroxy vitamin D level from May 29, 2024 is 22.4.     Review of Systems   Constitutional: Negative.  Negative for chills, fatigue and fever.   HENT: Negative.  Negative for trouble swallowing and voice change.    Eyes:  Negative for photophobia, pain, discharge, redness, itching and visual disturbance.   Respiratory:  Negative for cough and shortness of breath.    Cardiovascular:  Negative for chest pain and palpitations.   Gastrointestinal:  Negative for abdominal pain, constipation, diarrhea, nausea and vomiting.   Endocrine: Negative for cold intolerance, heat intolerance, polydipsia, polyphagia and polyuria.   Genitourinary: Negative.    Musculoskeletal: Negative.    Skin: Negative.    Allergic/Immunologic: Negative.    Neurological:  Negative for dizziness, syncope, light-headedness and headaches.   Hematological: Negative.    Psychiatric/Behavioral: Negative.     All other systems reviewed and are negative.      Historical Information   History reviewed. No pertinent past medical history.  History reviewed. No pertinent surgical history.  Social History   Social History     Substance and Sexual Activity   Alcohol Use Not Currently    Alcohol/week: 2.0 standard drinks of alcohol    Comment: some beer with dinner      Social History     Substance and Sexual Activity   Drug Use Yes    Frequency: 0.5 times per week    Types: Marijuana    Comment: D8     Social History     Tobacco Use   Smoking Status Never   Smokeless Tobacco Never     Family History:   Family History   Problem Relation Age of Onset    Migraines Mother     Breast cancer Mother     Heart disease Father     Addiction problem Father     Diabetes unspecified Father     Pancreatitis Father     No Known Problems Sister     Heart disease Maternal Grandmother     Heart disease  Maternal Grandfather     Prostate cancer Maternal Grandfather        Meds/Allergies   Current Outpatient Medications   Medication Sig Dispense Refill    Accu-Chek FastClix Lancets MISC 4 times daily 100 each 3    acetone, urine, test (Ketostix) strip Use prn hyperglycemia 25 each 0    amphetamine-dextroamphetamine (ADDERALL) 20 mg tablet Take 20 mg by mouth 2 (two) times a day      Blood Glucose Monitoring Suppl (FreeStyle Lite) w/Device KIT Check blood sugars 4 times daily 1 kit 0    Cholecalciferol (Vitamin D3) 125 MCG (5000 UT) CAPS take 1 capsule by mouth once daily 30 capsule 5    Continuous Blood Gluc  (Dexcom G7 ) IRMA Use 1 Device 4 (four) times a day (before meals and at bedtime) 1 each 0    Continuous Blood Gluc Sensor (Dexcom G7 Sensor) Use 1 Device continuous Replace sensor every 10 day 9 each 3    Glucagon (Baqsimi One Pack) 3 MG/DOSE POWD 3 mg prn severe hypoglycemia 2 each 1    glucose blood (FREESTYLE LITE) test strip Check blood sugar 4 times daily 200 strip 6    hydrOXYzine HCL (ATARAX) 25 mg tablet 1 TO 2 AT BEDTIME      Insulin Disposable Pump (Omnipod DASH Intro, Gen 4,) KIT Change every 3 days. 1 kit 0    Insulin Disposable Pump (Omnipod DASH Pods, Gen 4,) MISC Change every 72 hours. 6 each 6    insulin lispro (HumaLOG) 100 units/mL injection Use up to 50 units daily via insulin pump. 50 mL 1    L-Methylfolate 15 MG TABS Take by mouth      venlafaxine (EFFEXOR-XR) 37.5 mg 24 hr capsule Take 37.5 mg by mouth daily      amphetamine-dextroamphetamine (ADDERALL XR) 20 MG 24 hr capsule Take 20 mg by mouth daily (Patient not taking: Reported on 5/5/2023)      amphetamine-dextroamphetamine (ADDERALL) 15 MG tablet Take 1 tablet by mouth 2 (two) times a day (Patient not taking: Reported on 8/11/2023)      Continuous Blood Gluc Transmit (Dexcom G6 Transmitter) MISC q 90 days (Patient not taking: Reported on 8/11/2023) 1 each 0    escitalopram (LEXAPRO) 20 mg tablet Take 20 mg by mouth  "daily (Patient not taking: Reported on 8/11/2023)      FLUoxetine (PROzac) 40 MG capsule Take 80 mg by mouth daily (Patient not taking: Reported on 11/17/2023)      insulin glargine (Basaglar KwikPen) 100 units/mL injection pen 18 units daily (Patient not taking: Reported on 11/4/2022) 15 mL 2    insulin lispro (HumaLOG) 100 units/mL injection Inject 65 unit daily via insulin pump (Patient not taking: Reported on 5/31/2024) 60 mL 3    Insulin Pen Needle (BD Pen Needle Ibis U/F) 32G X 4 MM MISC 4 times daily (Patient not taking: Reported on 2/23/2024) 400 each 3    mirtazapine (REMERON) 15 mg tablet Take 15 mg by mouth daily at bedtime (Patient not taking: Reported on 5/5/2023)      multivitamin (THERAGRAN) TABS Take 1 tablet by mouth if needed (Patient not taking: Reported on 2/23/2024)      traZODone (DESYREL) 50 mg tablet 50 mg if needed (Patient not taking: Reported on 11/17/2023)       No current facility-administered medications for this visit.     Allergies   Allergen Reactions    Bupropion Rash       Objective   Vitals: Blood pressure 116/80, pulse (!) 117, height 5' 10\" (1.778 m), weight 61.5 kg (135 lb 9.6 oz).    Physical Exam  Vitals reviewed.   Constitutional:       Appearance: He is well-developed.      Comments: Thin build   HENT:      Head: Normocephalic and atraumatic.      Nose: Nose normal.   Eyes:      Conjunctiva/sclera: Conjunctivae normal.      Pupils: Pupils are equal, round, and reactive to light.   Cardiovascular:      Rate and Rhythm: Normal rate and regular rhythm.      Heart sounds: Normal heart sounds.   Pulmonary:      Effort: Pulmonary effort is normal.      Breath sounds: Normal breath sounds.   Abdominal:      General: Bowel sounds are normal.      Palpations: Abdomen is soft.   Musculoskeletal:         General: Normal range of motion.      Cervical back: Normal range of motion and neck supple.   Skin:     General: Skin is warm and dry.   Neurological:      Mental Status: He is alert " and oriented to person, place, and time.   Psychiatric:         Behavior: Behavior normal.         Thought Content: Thought content normal.         Judgment: Judgment normal.       Lab Results:   Lab Results   Component Value Date/Time    Hemoglobin A1C 5.9 (H) 05/29/2024 09:02 AM    Hemoglobin A1C 5.8 (H) 02/19/2024 10:19 AM    Hemoglobin A1C 5.7 (H) 11/09/2023 07:45 AM    White Blood Cell Count 4.3 05/29/2024 09:02 AM    White Blood Cell Count 4.1 11/09/2023 07:45 AM    White Blood Cell Count 6.9 08/02/2023 07:31 AM    Hemoglobin 14.9 05/29/2024 09:02 AM    Hemoglobin 16.6 11/09/2023 07:45 AM    Hemoglobin 15.0 08/02/2023 07:31 AM    HCT 44.0 05/29/2024 09:02 AM    HCT 49.1 11/09/2023 07:45 AM    HCT 44.6 08/02/2023 07:31 AM    MCV 86 05/29/2024 09:02 AM    MCV 86 11/09/2023 07:45 AM    MCV 86 08/02/2023 07:31 AM    Platelet Count 159 05/29/2024 09:02 AM    Platelet Count 221 11/09/2023 07:45 AM    Platelet Count 211 08/02/2023 07:31 AM    BUN 20 05/29/2024 09:02 AM    BUN 19 02/19/2024 10:19 AM    BUN 19 11/09/2023 07:45 AM    Potassium 4.1 05/29/2024 09:02 AM    Potassium 4.3 02/19/2024 10:19 AM    Potassium 4.9 11/09/2023 07:45 AM    Chloride 101 05/29/2024 09:02 AM    Chloride 99 02/19/2024 10:19 AM    Chloride 100 11/09/2023 07:45 AM    CO2 24 05/29/2024 09:02 AM    CO2 22 02/19/2024 10:19 AM    CO2 25 11/09/2023 07:45 AM    Creatinine 0.96 05/29/2024 09:02 AM    Creatinine 0.93 02/19/2024 10:19 AM    Creatinine 1.02 11/09/2023 07:45 AM    AST 12 05/29/2024 09:02 AM    AST 13 02/19/2024 10:19 AM    AST 14 11/09/2023 07:45 AM    ALT 13 05/29/2024 09:02 AM    ALT 18 02/19/2024 10:19 AM    ALT 13 11/09/2023 07:45 AM    Protein, Total 6.9 05/29/2024 09:02 AM    Protein, Total 7.5 02/19/2024 10:19 AM    Protein, Total 7.2 11/09/2023 07:45 AM    Albumin 4.5 05/29/2024 09:02 AM    Albumin 5.1 02/19/2024 10:19 AM    Albumin 4.9 11/09/2023 07:45 AM    Globulin, Total 2.4 05/29/2024 09:02 AM    Globulin, Total 2.4  "02/19/2024 10:19 AM    Globulin, Total 2.3 11/09/2023 07:45 AM    HDL 45 05/29/2024 09:02 AM    HDL 35 (L) 11/09/2023 07:45 AM    Triglycerides 101 05/29/2024 09:02 AM    Triglycerides 97 11/09/2023 07:45 AM     Portions of the record may have been created with voice recognition software. Occasional wrong word or \"sound a like\" substitutions may have occurred due to the inherent limitations of voice recognition software. Read the chart carefully and recognize, using context, where substitutions have occurred.    "

## 2024-06-03 NOTE — TELEPHONE ENCOUNTER
Patient was seen on 5-31-24.  We thought we scheduled his 3 month follow up appointment, but it looks like it was never finalized.    Left message on 5-31-24 and again on 6-3-24 for patient to call us back to let us know what his appointment card shows.  We may need to reschedule it if the date/time we gave him is no longer available.

## 2024-06-10 NOTE — TELEPHONE ENCOUNTER
The pt called back and his appt card had September 6, 2024 at 10:50am. That appt was already taken but he is coming the same day but at 9:45am.

## 2024-06-20 ENCOUNTER — OFFICE VISIT (OUTPATIENT)
Dept: GASTROENTEROLOGY | Facility: CLINIC | Age: 24
End: 2024-06-20
Payer: COMMERCIAL

## 2024-06-20 ENCOUNTER — TELEPHONE (OUTPATIENT)
Dept: GASTROENTEROLOGY | Facility: CLINIC | Age: 24
End: 2024-06-20

## 2024-06-20 VITALS
HEIGHT: 71 IN | SYSTOLIC BLOOD PRESSURE: 116 MMHG | DIASTOLIC BLOOD PRESSURE: 68 MMHG | BODY MASS INDEX: 18.34 KG/M2 | WEIGHT: 131 LBS

## 2024-06-20 DIAGNOSIS — R11.14 BILIOUS VOMITING WITH NAUSEA: ICD-10-CM

## 2024-06-20 DIAGNOSIS — R13.19 ESOPHAGEAL DYSPHAGIA: ICD-10-CM

## 2024-06-20 DIAGNOSIS — R63.0 ANOREXIA: Primary | ICD-10-CM

## 2024-06-20 DIAGNOSIS — R10.10 UPPER ABDOMINAL PAIN: ICD-10-CM

## 2024-06-20 DIAGNOSIS — R63.4 UNINTENTIONAL WEIGHT LOSS: ICD-10-CM

## 2024-06-20 PROCEDURE — 99204 OFFICE O/P NEW MOD 45 MIN: CPT | Performed by: PHYSICIAN ASSISTANT

## 2024-06-20 RX ORDER — FAMOTIDINE 40 MG/1
TABLET, FILM COATED ORAL
COMMUNITY
Start: 2024-04-16

## 2024-06-20 RX ORDER — DULOXETIN HYDROCHLORIDE 20 MG/1
20 CAPSULE, DELAYED RELEASE ORAL DAILY
COMMUNITY

## 2024-06-20 NOTE — PROGRESS NOTES
[facility] - Outpatient Consultation  Elroy Salcido 23 y.o. male MRN: [id number]  Encounter: [id number]    ASSESSMENT AND PLAN:    1. Anorexia  2. Unintentional weight loss  Patient with poor appetite early satiety and 60 pound weight loss since diabetes diagnosis (10/2021)  nearly 3 years ago, 30 lbs of it was lost in the last 6m.  Recent labs unremarkable.  We are further evaluating for GI causes with endoscopy.  Mother concerned about malabsorption.  No diarrhea but has 1 loose stool a day.  Will check fecal calprotectin and pancreatic elastase and fecal fat.  If above workup unremarkable and no etiology found consider colonoscopy and cross-sectional imaging.  Patient encouraged to eat small frequent meals and will monitor weight.  There may be a psychiatric component as he is anxious that foods will worsen his blood sugars.  He is already following with psychiatry, and was advised to continue to follow with nutritionist and endocrinology.    - Calprotectin,Fecal; Future  - Pancreatic elastase, fecal; Future  - Fecal fat, qualitative    3. Upper abdominal pain  4. Bilious vomiting with nausea  Patient complains of upper abdominal pain worse with eating and movement for the last 1 year, daily nausea with intermittent vomiting  for the past 1 year.  Diagnosed with diabetes 10/2021 but blood sugars under good control, recent A1c 5.9.  Reports recent abdominal ultrasound unremarkable at [place] and results requested.  Patient advised to eat small frequent meals.  I offered antiemetic but he has been on multiple in the past without improvement and declined.  Offered EGD to rule out esophagogastroduodenitis,  peptic ulcer disease, H. pylori and underlying malignancy.  I reviewed indications, risk, benefits and alternatives of an endoscopy and he is willing to proceed seed.  E consent obtained.  If workup negative consider gastric emptying study.    - EGD; Future      5.  Esophageal dysphagia  Reports dysphagia to solids for the last 1 year without regurgitation or odynophagia or reflux.  Etiology of symptoms is unclear.  Differential diagnosis includes web, ring, stricture, esophagitis, motility disorder less likely malignancy.  Will evaluate with endoscopy with esophageal biopsies and dilation.  In the interim patient advised to eat slowly, chew thoroughly and avoid the foods that exacerbate his symptoms.      Follow up Appointment: EGD, 6w with physician    Chief Complaint   Patient presents with   • Loss of Appetite   • Vomiting          • Nausea   • Diarrhea   • Excessive burping   • Weight Loss     60# since diabetes diagnosis       HPI:   Mother present (Obinna Salcido is a 23 y.o. year old male with a PMH significant for DM1, ADHD, anxiety, depression being evaluated at the request of Dr. Strauss for multiple upper GI issues and weight loss.  He has never had endoscopy or colonoscopy.  No family history of GI malignancy, IBD or celiac disease.    Seen by endocrinology 5/2024 having anxiety with food intake due to elevated blood sugar.  Advised to see nutritionist.    5/2024 CBC, ESR, lipase, CRP, CMP, TSH normal.  A1c 5.9.  Vitamin D low.    Ports abdominal ultrasound [place] this year unremarkable.  Will request records.    Patient reports he was diagnosed with diabetes 10/2021.  In the last nearly 3 years he has lost 60 pounds, 30 of which have been within the last 6 months.  He notes poor appetite, early satiety, dysgeusia and nausea with the smell of food.  He usually takes 2 bites feels full and vomits food denies bilious or bloody emesis.  Currently vomiting about once a week and has nausea daily denies migraines or vertigo.  Has been having dysphagia to solids for the last 1 year but denies regurgitation, odynophagia or reflux.  He does note excessive eructation but denies excessive bloating or flatulence.  Has 1 loose stool a day without bleeding.   Follows with psychiatry for anxiety and depression.  Denies underlying eating disorder.  States above symptoms mentioned to psychiatry and was advised to follow-up with GI.  Mother and patient very concerned his venlafaxine was the cause of his GI issues mostly the vomiting as he started this medication May 2023 and was increasing dose up until February 2024 and since then has been weaning.  Last night was his first day off the medication.  He does smoke delta 8 similar to marijuana about twice a week no other drug use.  Complains of intermittent, nonradiating 7/10, upper abdominal pain described as stabbing that is worse with eating and movements.  No nocturnal issues.  He is taking famotidine 40 mg twice daily without much improvement.  He takes Adderall as needed for ADHD for the past 2 years.    Pt denies all other GI and constitutional symptoms.      ROS:   Constitutional: denies fatigue, fever.  HEENT: denies visual disturbance, postnasal drip, sore throat.  Respiratory: denies cough, shortness of breath.  Cardiovascular: denies chest pain, leg swelling.  Gastrointestinal: as noted above in HPI.  : denies difficulty urinating, dysuria.  Musculoskeletal: denies arthralgias, back pain.  Neurological: denies dizziness, syncope.  Psychiatric: denies confusion, + anxiety.    Historical Information   Past Medical History:   Diagnosis Date   • ADHD    • Anxiety    • Depression    • Diabetes 1.5, managed as type 1 (HCC)      Past Surgical History:   Procedure Laterality Date   • WISDOM TOOTH EXTRACTION       Social History     Substance and Sexual Activity   Alcohol Use Not Currently   • Alcohol/week: 2.0 standard drinks of alcohol    Comment: some beer with dinner      Social History     Substance and Sexual Activity   Drug Use Yes   • Frequency: 0.5 times per week   • Types: Marijuana    Comment: D8     Social History     Tobacco Use   Smoking Status Never   Smokeless Tobacco Never     Family History   Problem  "Relation Age of Onset   • Migraines Mother    • Breast cancer Mother    • Heart disease Father    • Addiction problem Father    • Diabetes unspecified Father    • Pancreatitis Father    • No Known Problems Sister    • Heart disease Maternal Grandmother    • Heart disease Maternal Grandfather    • Prostate cancer Maternal Grandfather    • Colon cancer Neg Hx    • Colon polyps Neg Hx        Meds/Allergies     Current Outpatient Medications:   •  Accu-Chek FastClix Lancets MISC  •  acetone, urine, test (Ketostix) strip  •  amphetamine-dextroamphetamine (ADDERALL) 15 MG tablet  •  Blood Glucose Monitoring Suppl (FreeStyle Lite) w/Device KIT  •  Cholecalciferol (Vitamin D3) 125 MCG (5000 UT) CAPS  •  Continuous Blood Gluc  (Dexcom G7 ) IRMA  •  Continuous Blood Gluc Sensor (Dexcom G7 Sensor)  •  DULoxetine (CYMBALTA) 20 mg capsule  •  famotidine (PEPCID) 40 MG tablet  •  Glucagon (Baqsimi One Pack) 3 MG/DOSE POWD  •  glucose blood (FREESTYLE LITE) test strip  •  Insulin Disposable Pump (Omnipod DASH Pods, Gen 4,) MISC  •  insulin lispro (HumaLOG) 100 units/mL injection  •  Insulin Pen Needle (BD Pen Needle Ibis U/F) 32G X 4 MM MISC  •  L-Methylfolate 15 MG TABS    Allergies   Allergen Reactions   • Bupropion Rash       PHYSICAL EXAM:    Blood pressure 116/68, height 5' 11\" (1.803 m), weight 59.4 kg (131 lb). Body mass index is 18.27 kg/m².    Constitutional: Well-developed, no acute distress, thin  HEENT: normocephalic, mucous membranes moist.  Neck: Supple  Skin: warm and dry  Respiratory: Lungs are clear to auscultation B/L.  Cardiovascular: Heart is regular rate and rhythm.  Gastrointestinal: Soft, mild upper abd tenderness, nondistended with normal active bowel sounds.  No masses, guarding, rebound.   Rectal Exam: Deferred.  Extremities: No edema.  Neurologic: Nonfocal. A & O ×3.   Psychiatric: flat affect.    Lab Results:   As above    Radiology Results:   No results found.    Patient expressed " understanding and had all questions and concerns addressed.    Advised patient to call with any questions, failure to improve, or if symptoms change or worsen.    Nikki Sampson PA-C  06/20/24  2:05 PM    This chart was completed in part utilizing [employer] speech voice recognition software. Random word insertions, pronoun errors, and incomplete sentences are an occasional consequence of this system due to software limitations, and ambient noise. Any questions or concerns about the content, text, or information contained within the body of this dictation should be directly addressed to the provider for clarification.

## 2024-06-20 NOTE — PATIENT INSTRUCTIONS
-egd  -get US from [place]  -stools  -follow with endocrinologist/ psychiatry/nutritionist  -6w OV with physician

## 2024-06-20 NOTE — TELEPHONE ENCOUNTER
Scheduled date of egd (as of today):  07-  Physician performing colonoscopy:  BYRON MCKAY   Location of EGD:  [facility]  Instructions reviewed with patient by:  MANUEL  Clearances:  PATIENT IS TYPE 1 DIABETIC

## 2024-07-02 DIAGNOSIS — E10.9 TYPE 1 DIABETES MELLITUS WITHOUT COMPLICATION (HCC): ICD-10-CM

## 2024-07-02 RX ORDER — ACYCLOVIR 400 MG/1
TABLET ORAL
Qty: 9 EACH | Refills: 3 | Status: SHIPPED | OUTPATIENT
Start: 2024-07-02

## 2024-07-07 LAB
CALPROTECTIN STL-MCNT: 17 UG/G (ref 0–120)
ELASTASE PANC STL-MCNT: 222 UG ELAST./G
FAT STL QL: NORMAL
NEUTRAL FAT STL QL: NORMAL

## 2024-07-11 ENCOUNTER — ANESTHESIA (OUTPATIENT)
Dept: ANESTHESIOLOGY | Facility: AMBULATORY SURGERY CENTER | Age: 24
End: 2024-07-11

## 2024-07-11 ENCOUNTER — ANESTHESIA EVENT (OUTPATIENT)
Dept: ANESTHESIOLOGY | Facility: AMBULATORY SURGERY CENTER | Age: 24
End: 2024-07-11

## 2024-07-17 ENCOUNTER — TELEPHONE (OUTPATIENT)
Dept: GASTROENTEROLOGY | Facility: CLINIC | Age: 24
End: 2024-07-17

## 2024-07-17 NOTE — TELEPHONE ENCOUNTER
Procedure confirmed  Endoscopy     Via: Voice mail    Instructions given: Given to Patient at Visit     Prep Given: N/A    Call the office if there are any questions.

## 2024-07-23 ENCOUNTER — TELEPHONE (OUTPATIENT)
Age: 24
End: 2024-07-23

## 2024-07-23 NOTE — TELEPHONE ENCOUNTER
Patient calling to see if insulin adjustment need to be made prior to his endoscopy that is to be done on 7/25? Please call patient back asap with an update.

## 2024-07-25 ENCOUNTER — ANESTHESIA (OUTPATIENT)
Dept: GASTROENTEROLOGY | Facility: AMBULATORY SURGERY CENTER | Age: 24
End: 2024-07-25

## 2024-07-25 ENCOUNTER — ANESTHESIA EVENT (OUTPATIENT)
Dept: GASTROENTEROLOGY | Facility: AMBULATORY SURGERY CENTER | Age: 24
End: 2024-07-25

## 2024-07-25 ENCOUNTER — HOSPITAL ENCOUNTER (OUTPATIENT)
Dept: GASTROENTEROLOGY | Facility: AMBULATORY SURGERY CENTER | Age: 24
Discharge: HOME | End: 2024-07-25
Payer: COMMERCIAL

## 2024-07-25 VITALS
DIASTOLIC BLOOD PRESSURE: 78 MMHG | WEIGHT: 130 LBS | HEIGHT: 71 IN | OXYGEN SATURATION: 94 % | HEART RATE: 83 BPM | TEMPERATURE: 97.8 F | BODY MASS INDEX: 18.2 KG/M2 | SYSTOLIC BLOOD PRESSURE: 105 MMHG | RESPIRATION RATE: 20 BRPM

## 2024-07-25 DIAGNOSIS — R10.10 UPPER ABDOMINAL PAIN: ICD-10-CM

## 2024-07-25 PROCEDURE — 43235 EGD DIAGNOSTIC BRUSH WASH: CPT | Performed by: INTERNAL MEDICINE

## 2024-07-25 RX ORDER — SODIUM CHLORIDE, SODIUM LACTATE, POTASSIUM CHLORIDE, CALCIUM CHLORIDE 600; 310; 30; 20 MG/100ML; MG/100ML; MG/100ML; MG/100ML
50 INJECTION, SOLUTION INTRAVENOUS CONTINUOUS
Status: DISCONTINUED | OUTPATIENT
Start: 2024-07-25 | End: 2024-07-29 | Stop reason: HOSPADM

## 2024-07-25 RX ORDER — PROPOFOL 10 MG/ML
INJECTION, EMULSION INTRAVENOUS AS NEEDED
Status: DISCONTINUED | OUTPATIENT
Start: 2024-07-25 | End: 2024-07-25

## 2024-07-25 RX ORDER — LIDOCAINE HYDROCHLORIDE 10 MG/ML
INJECTION, SOLUTION EPIDURAL; INFILTRATION; INTRACAUDAL; PERINEURAL AS NEEDED
Status: DISCONTINUED | OUTPATIENT
Start: 2024-07-25 | End: 2024-07-25

## 2024-07-25 RX ADMIN — LIDOCAINE HYDROCHLORIDE 50 MG: 10 INJECTION, SOLUTION EPIDURAL; INFILTRATION; INTRACAUDAL; PERINEURAL at 07:59

## 2024-07-25 RX ADMIN — PROPOFOL 50 MG: 10 INJECTION, EMULSION INTRAVENOUS at 08:01

## 2024-07-25 RX ADMIN — PROPOFOL 50 MG: 10 INJECTION, EMULSION INTRAVENOUS at 08:00

## 2024-07-25 RX ADMIN — SODIUM CHLORIDE, SODIUM LACTATE, POTASSIUM CHLORIDE, CALCIUM CHLORIDE 50 ML/HR: 600; 310; 30; 20 INJECTION, SOLUTION INTRAVENOUS at 07:50

## 2024-07-25 RX ADMIN — PROPOFOL 150 MG: 10 INJECTION, EMULSION INTRAVENOUS at 07:59

## 2024-07-25 NOTE — ANESTHESIA POSTPROCEDURE EVALUATION
Post-Op Assessment Note    CV Status:  Stable  Pain Score: 0    Pain management: adequate       Mental Status:  Alert and awake   Hydration Status:  Euvolemic   PONV Controlled:  Controlled   Airway Patency:  Patent     Post Op Vitals Reviewed: Yes    No anethesia notable event occurred.    Staff: CRNA               BP   101/58   Temp   97.8   Pulse  77   Resp   18   SpO2   96

## 2024-07-25 NOTE — H&P
History and Physical -  [facility]  Elroy Salcido 23 y.o. male MRN: [id number]    HPI: Elroy Salcido is a 23 y.o. male who presents for upper endoscopy due to anorexia, nausea, and weight loss    REVIEW OF SYSTEMS: Per the HPI, and otherwise unremarkable.    Historical Information   Past Medical History:   Diagnosis Date    ADHD     Anxiety     Depression     Diabetes 1.5, managed as type 1 (HCC)      Past Surgical History:   Procedure Laterality Date    WISDOM TOOTH EXTRACTION       Social History   Social History     Substance and Sexual Activity   Alcohol Use Not Currently     Social History     Substance and Sexual Activity   Drug Use Yes    Frequency: 0.5 times per week    Types: Marijuana    Comment: D8     Social History     Tobacco Use   Smoking Status Never   Smokeless Tobacco Never     Family History   Problem Relation Age of Onset    Migraines Mother     Breast cancer Mother     Heart disease Father     Addiction problem Father     Diabetes unspecified Father     Pancreatitis Father     No Known Problems Sister     Heart disease Maternal Grandmother     Heart disease Maternal Grandfather     Prostate cancer Maternal Grandfather     Colon cancer Neg Hx     Colon polyps Neg Hx        Meds/Allergies       Current Outpatient Medications:     amphetamine-dextroamphetamine (ADDERALL) 15 MG tablet    Cholecalciferol (Vitamin D3) 125 MCG (5000 UT) CAPS    DULoxetine (CYMBALTA) 20 mg capsule    L-Methylfolate 15 MG TABS    Accu-Chek FastClix Lancets MISC    acetone, urine, test (Ketostix) strip    Blood Glucose Monitoring Suppl (FreeStyle Lite) w/Device KIT    Continuous Blood Gluc  (Dexcom G7 ) IRMA    Continuous Glucose Sensor (Dexcom G7 Sensor)    famotidine (PEPCID) 40 MG tablet    Glucagon (Baqsimi One Pack) 3 MG/DOSE POWD    glucose blood (FREESTYLE LITE) test strip    Insulin Disposable Pump (Omnipod DASH Pods, Gen 4,) MISC    insulin lispro (HumaLOG) 100  "units/mL injection    Insulin Pen Needle (BD Pen Needle Ibis U/F) 32G X 4 MM MISC    Current Facility-Administered Medications:     lactated ringers infusion, 50 mL/hr, Intravenous, Continuous    Allergies   Allergen Reactions    Bupropion Rash       Objective     /72   Pulse 90   Temp 97.8 °F (36.6 °C) (Temporal)   Resp (!) 24   Ht 5' 11\" (1.803 m)   Wt 59 kg (130 lb)   SpO2 97%   BMI 18.13 kg/m²     PHYSICAL EXAM    General Appearance: NAD, cooperative, alert  Eyes: Anicteric  GI:  Soft, non-tender, non-distended; normal bowel sounds; no masses, no organomegaly   Rectal: Deferred until procedure  Musculoskeletal: No edema.  Skin:  No jaundice    ASSESSMENT/PLAN:  This is a 23 y.o. year old male here for upper endoscopy, and he is stable and optimized for his procedure.        "

## 2024-07-25 NOTE — ANESTHESIA PREPROCEDURE EVALUATION
Procedure:  EGD    Relevant Problems   ANESTHESIA (within normal limits)      ENDO   (+) Type 1 diabetes mellitus without complication (HCC)      NEURO/PSYCH   (+) Anxiety   (+) Depression      Behavioral Health   (+) ADHD        Physical Exam    Airway    Mallampati score: I  TM Distance: >3 FB  Neck ROM: full     Dental   No notable dental hx     Cardiovascular  Cardiovascular exam normal    Pulmonary  Pulmonary exam normal     Other Findings        Anesthesia Plan  ASA Score- 2     Anesthesia Type- IV sedation with anesthesia with ASA Monitors.         Additional Monitors:     Airway Plan:     Comment: I discussed risks (reviewed with patient on the anesthesia consent form), benefits and alternatives of monitored sedation including the possibility under sedation to have recall or mild discomfort.  .       Plan Factors-    Chart reviewed.    Patient summary reviewed.                  Induction- intravenous.    Postoperative Plan-         Informed Consent- Anesthetic plan and risks discussed with patient.  I personally reviewed this patient with the CRNA. Discussed and agreed on the Anesthesia Plan with the CRNA..

## 2024-07-30 ENCOUNTER — TELEPHONE (OUTPATIENT)
Age: 24
End: 2024-07-30

## 2024-07-30 NOTE — TELEPHONE ENCOUNTER
Patients mom called and stated patient had an ultrasound and  they only have the report and not the imagining called office to make sure that was okay and they said that's fine to bring it at the time of the appt thank you

## 2024-08-06 ENCOUNTER — OFFICE VISIT (OUTPATIENT)
Dept: GASTROENTEROLOGY | Facility: CLINIC | Age: 24
End: 2024-08-06
Payer: COMMERCIAL

## 2024-08-06 VITALS
DIASTOLIC BLOOD PRESSURE: 70 MMHG | WEIGHT: 130.2 LBS | HEIGHT: 71 IN | BODY MASS INDEX: 18.23 KG/M2 | SYSTOLIC BLOOD PRESSURE: 110 MMHG

## 2024-08-06 DIAGNOSIS — K30 DELAYED GASTRIC EMPTYING: Primary | ICD-10-CM

## 2024-08-06 PROCEDURE — 99214 OFFICE O/P EST MOD 30 MIN: CPT | Performed by: INTERNAL MEDICINE

## 2024-08-06 RX ORDER — OMEPRAZOLE 40 MG/1
40 CAPSULE, DELAYED RELEASE ORAL DAILY
Qty: 90 CAPSULE | Refills: 0 | Status: SHIPPED | OUTPATIENT
Start: 2024-08-06 | End: 2024-08-06

## 2024-08-06 RX ORDER — OMEPRAZOLE 40 MG/1
40 CAPSULE, DELAYED RELEASE ORAL DAILY
Qty: 90 CAPSULE | Refills: 0 | Status: SHIPPED | OUTPATIENT
Start: 2024-08-06

## 2024-08-06 NOTE — PROGRESS NOTES
[facility] - Outpatient Follow-up Note  Elroy Salcido 23 y.o. male MRN: [id number]  Encounter: [id number]    ASSESSMENT AND PLAN:      1. Delayed gastric emptying  Still  awaiting gastric emptying scan to confirm.  In the meantime I have prescribed him omeprazole 40 mg with the instruction to open the capsules into applesauce or yogurt.  While he likely has gastroparesis, it seems the vast majority of his symptoms have a psychologic component  - omeprazole (PriLOSEC) 40 MG capsule; Take 1 capsule (40 mg total) by mouth daily Open capsules into apple sauce or yogurt prior to ingestion  Dispense: 90 capsule; Refill: 0      Follow up appointment: 3 months    _______________________      Chief Complaint   Patient presents with    Follow-up     Follow up to egd.       HPI:   Patient is a 23 y.o. male with a significant PMH of diabetes presenting for follow up regarding his endoscopy from last week.  Upper endoscopy showed stomach full of retained food.  He likely has gastroparesis, so gastric emptying scan has been ordered, but it is not scheduled until next month.  Unsurprisingly, he still has the same symptoms    Historical Information   Past Medical History:   Diagnosis Date    ADHD     Anxiety     Depression     Diabetes 1.5, managed as type 1 (HCC)      Past Surgical History:   Procedure Laterality Date    WISDOM TOOTH EXTRACTION       Social History     Substance and Sexual Activity   Alcohol Use Not Currently     Social History     Substance and Sexual Activity   Drug Use Yes    Frequency: 0.5 times per week    Types: Marijuana    Comment: D8     Social History     Tobacco Use   Smoking Status Never   Smokeless Tobacco Never     Family History   Problem Relation Age of Onset    Migraines Mother     Breast cancer Mother     Heart disease Father     Addiction problem Father     Diabetes unspecified Father     Pancreatitis Father     No Known Problems Sister     Heart  "disease Maternal Grandmother     Heart disease Maternal Grandfather     Prostate cancer Maternal Grandfather     Colon cancer Neg Hx     Colon polyps Neg Hx          Current Outpatient Medications:     Accu-Chek FastClix Lancets MISC    acetone, urine, test (Ketostix) strip    amphetamine-dextroamphetamine (ADDERALL) 15 MG tablet    Blood Glucose Monitoring Suppl (FreeStyle Lite) w/Device KIT    Cholecalciferol (Vitamin D3) 125 MCG (5000 UT) CAPS    Continuous Blood Gluc  (Dexcom G7 ) IRMA    Continuous Glucose Sensor (Dexcom G7 Sensor)    DULoxetine (CYMBALTA) 20 mg capsule    Glucagon (Baqsimi One Pack) 3 MG/DOSE POWD    glucose blood (FREESTYLE LITE) test strip    Insulin Disposable Pump (Omnipod DASH Pods, Gen 4,) MISC    insulin lispro (HumaLOG) 100 units/mL injection    Insulin Pen Needle (BD Pen Needle Ibis U/F) 32G X 4 MM MISC    L-Methylfolate 15 MG TABS    omeprazole (PriLOSEC) 40 MG capsule  Allergies   Allergen Reactions    Bupropion Rash     Reviewed medications and allergies and updated as indicated    PHYSICAL EXAM:    Blood pressure 110/70, height 5' 11\" (1.803 m), weight 59.1 kg (130 lb 3.2 oz). Body mass index is 18.16 kg/m².  General Appearance: NAD, cooperative, alert, disheveled.  Speaks very low  Eyes: Anicteric  GI:  Soft, non-tender, non-distended; normal bowel sounds; no masses, no organomegaly   Rectal: Deferred  Musculoskeletal: No edema.  Skin:  No jaundice    Lab Results:   Lab Results   Component Value Date    WBC 4.3 05/29/2024    WBC 4.1 11/09/2023    WBC 6.9 08/02/2023    HGB 14.9 05/29/2024    HGB 16.6 11/09/2023    HGB 15.0 08/02/2023    MCV 86 05/29/2024     05/29/2024     11/09/2023     08/02/2023     Lab Results   Component Value Date    K 4.1 05/29/2024     05/29/2024    CO2 24 05/29/2024    BUN 20 05/29/2024    CREATININE 0.96 05/29/2024    AST 12 05/29/2024    AST 13 02/19/2024    AST 14 11/09/2023    ALT 13 05/29/2024    ALT 18 " "02/19/2024    ALT 13 11/09/2023    EGFR 114 05/29/2024     Lab Results   Component Value Date    IRON 131 05/04/2022    TIBC 292 05/04/2022    FERRITIN 125 05/04/2022     No results found for: \"LIPASE\"    Radiology Results:   EGD    Result Date: 7/25/2024  Narrative: Table formatting from the original result was not included. [facility] [address] [place] [address] [facility] [id number] [phone] DATE OF SERVICE: 7/25/24 PHYSICIAN(S): Attending: Melchor Sanford DO Fellow: No Staff Documented INDICATION: Upper abdominal pain POST-OP DIAGNOSIS: See the impression below. PREPROCEDURE: Informed consent was obtained for the procedure, including sedation.  Risks of perforation, hemorrhage, adverse drug reaction and aspiration were discussed. The patient was placed in the left lateral decubitus position. Patient was explained about the risks and benefits of the procedure. Risks including but not limited to bleeding, infection, and perforation were explained in detail. Also explained about less than 100% sensitivity with the exam and other alternatives. PROCEDURE: EGD DETAILS OF PROCEDURE: Patient was taken to the procedure room where a time out was performed to confirm correct patient and correct procedure. The patient underwent monitored anesthesia care, which was administered by an anesthesia professional. The patient's blood pressure, ECG and oxygen were monitored throughout the procedure. The scope was introduced through the mouth and advanced to the stomach. Retroflexion was performed in the fundus. The patient experienced no blood loss. The procedure was not difficult. The patient tolerated the procedure well. There were no apparent adverse events. ANESTHESIA INFORMATION: ASA: II Anesthesia Type: IV Sedation with Anesthesia MEDICATIONS: Totals unavailable because the procedure time range is not set FINDINGS: Foreign body in the stomach SPECIMENS: * No specimens in log * "     Impression: Foreign body in the stomach RECOMMENDATION: Gastric Emptying Scan    Melchor Sanford, DO

## 2024-09-06 ENCOUNTER — OFFICE VISIT (OUTPATIENT)
Dept: ENDOCRINOLOGY | Facility: HOSPITAL | Age: 24
End: 2024-09-06
Payer: COMMERCIAL

## 2024-09-06 VITALS
OXYGEN SATURATION: 98 % | DIASTOLIC BLOOD PRESSURE: 70 MMHG | HEIGHT: 71 IN | SYSTOLIC BLOOD PRESSURE: 118 MMHG | BODY MASS INDEX: 18.2 KG/M2 | WEIGHT: 130 LBS | HEART RATE: 124 BPM

## 2024-09-06 DIAGNOSIS — R73.9 HYPERGLYCEMIA: ICD-10-CM

## 2024-09-06 DIAGNOSIS — E10.9 TYPE 1 DIABETES MELLITUS WITHOUT COMPLICATION (HCC): Primary | ICD-10-CM

## 2024-09-06 DIAGNOSIS — E55.9 VITAMIN D DEFICIENCY: ICD-10-CM

## 2024-09-06 PROCEDURE — 95251 CONT GLUC MNTR ANALYSIS I&R: CPT | Performed by: NURSE PRACTITIONER

## 2024-09-06 PROCEDURE — 99214 OFFICE O/P EST MOD 30 MIN: CPT | Performed by: NURSE PRACTITIONER

## 2024-09-06 RX ORDER — DEXTROAMPHETAMINE SACCHARATE, AMPHETAMINE ASPARTATE, DEXTROAMPHETAMINE SULFATE AND AMPHETAMINE SULFATE 5; 5; 5; 5 MG/1; MG/1; MG/1; MG/1
TABLET ORAL
COMMUNITY
Start: 2024-09-04

## 2024-09-06 NOTE — PATIENT INSTRUCTIONS
Stay active and stay hydrated.     Keep up the good work !!!!!!!!!     Continue to utilize the OmniPod and the Dexcom continuous glucose monitor.     We will contact you with the results of your recent lab work when they are sent to the office and reviewed.

## 2024-09-06 NOTE — PROGRESS NOTES
Elroy Salcido 23 y.o. male MRN: [id number]    Encounter: [id number]      Assessment & Plan     Assessment:  This is a 23 y.o.-year-old male with type 1 diabetes with hyperglycemia and vitamin-D deficiency.     Plan:  1. Type 1 diabetes: Hemoglobin A1c is not available at the time of the visit.  Review of his Dexcom continuous glucose monitor and his Omnipod DASH pump download, he is relatively well controlled. For now, his insulin pump settings will remain the same. Reviewed recognition and proper treatment of hypoglycemic episodes.  He will contact the office with any problems.  He is having some anxiety related to eating and the resulting blood sugars.  He is also following up with GI.  we have discussed a follow-up with medical nutrition therapy for some help with his diet.  Check hemoglobin A1c and comprehensive metabolic panel prior to next visit.     2.  Vitamin-D deficiency: Continue supplementation with 2000 units of vitamin D3 daily.  Will continue to monitor levels over time.    CC: Type 1 Diabetes follow up    History of Present Illness     HPI:  23 y.o. year old male with type 1 diabetes for approximately 5 years.  He is on insulin and Omnipod DASH with a Dexcom continuous glucose monitor.      His pump settings are as follows:  Basal  Midnight - 0.65  Insulin to carbohydrate ratio  Midnight - 1:9  Sensitivity - 50  Blood glucose target range - 110     Hemoglobin A1c is not available at the time of the visit for review.  He denies any polyuria, polydipsia, nocturia and blurry vision. He denies neuropathy, nephropathy and retinopathy.  Recent diabetic foot exam was performed in office visit on February 23, 2024.  Mother is diagnosed with brain cancer and currently hospitalized.     Hypoglycemic episodes: No, rare. Does have intranasal glucagon in case of severe hypoglycemia.     Blood Sugar/Glucometer/Pump/CGM review: OmniPod DASH pump and Dexcom CGM download from 24 through September 6, 2024 visit  in target range of 87% with 13% elevated readings and less than <1% low readings.  He is utilizing 22.3 units of insulin per day 45.6 % of which is bolus and 54.4 % is basal.     For his vitamin D deficiency, he is not currently supplementing his vitamin D.  His most recent 25-hydroxy vitamin D level from May 29, 2024 is 22.4.     Review of Systems   Constitutional: Negative.  Negative for chills, fatigue and fever.   HENT: Negative.  Negative for trouble swallowing and voice change.    Eyes:  Negative for photophobia, pain, discharge, redness, itching and visual disturbance.   Respiratory:  Negative for cough and shortness of breath.    Cardiovascular:  Negative for chest pain and palpitations.   Gastrointestinal:  Negative for abdominal pain, constipation, diarrhea, nausea and vomiting.   Endocrine: Negative for cold intolerance, heat intolerance, polydipsia, polyphagia and polyuria.   Genitourinary: Negative.    Musculoskeletal: Negative.    Skin: Negative.    Allergic/Immunologic: Negative.    Neurological:  Negative for dizziness, syncope, light-headedness and headaches.   Hematological: Negative.    Psychiatric/Behavioral: Negative.     All other systems reviewed and are negative.      Historical Information   Past Medical History:   Diagnosis Date    ADHD     Anxiety     Depression     Diabetes 1.5, managed as type 1 (HCC)      Past Surgical History:   Procedure Laterality Date    WISDOM TOOTH EXTRACTION       Social History   Social History     Substance and Sexual Activity   Alcohol Use Not Currently     Social History     Substance and Sexual Activity   Drug Use Yes    Frequency: 0.5 times per week    Types: Marijuana    Comment: D8     Social History     Tobacco Use   Smoking Status Never   Smokeless Tobacco Never     Family History:   Family History   Problem Relation Age of Onset    Migraines Mother     Breast cancer Mother     Heart disease Father     Addiction problem Father     Diabetes unspecified  Father     Pancreatitis Father     No Known Problems Sister     Heart disease Maternal Grandmother     Heart disease Maternal Grandfather     Prostate cancer Maternal Grandfather     Colon cancer Neg Hx     Colon polyps Neg Hx        Meds/Allergies   Current Outpatient Medications   Medication Sig Dispense Refill    Accu-Chek FastClix Lancets MISC 4 times daily 100 each 3    acetone, urine, test (Ketostix) strip Use prn hyperglycemia 25 each 0    amphetamine-dextroamphetamine (ADDERALL) 20 mg tablet       Blood Glucose Monitoring Suppl (FreeStyle Lite) w/Device KIT Check blood sugars 4 times daily 1 kit 0    Cholecalciferol (Vitamin D3) 125 MCG (5000 UT) CAPS take 1 capsule by mouth once daily 30 capsule 5    Continuous Blood Gluc  (Dexcom G7 ) IRMA Use 1 Device 4 (four) times a day (before meals and at bedtime) 1 each 0    Continuous Glucose Sensor (Dexcom G7 Sensor) USE 1 DEVICE CONTINUOUSLY  REPLACE SENSOR EVERY 10 DAYS 9 each 3    DULoxetine (CYMBALTA) 20 mg capsule Take 60 mg by mouth daily      Glucagon (Baqsimi One Pack) 3 MG/DOSE POWD 3 mg prn severe hypoglycemia 2 each 1    glucose blood (FREESTYLE LITE) test strip Check blood sugar 4 times daily 200 strip 6    Insulin Disposable Pump (Omnipod DASH Pods, Gen 4,) MISC Change every 72 hours. 6 each 6    insulin lispro (HumaLOG) 100 units/mL injection Use up to 50 units daily via insulin pump. 50 mL 1    Insulin Pen Needle (BD Pen Needle Ibis U/F) 32G X 4 MM MISC 4 times daily 400 each 3    L-Methylfolate 15 MG TABS Take by mouth      omeprazole (PriLOSEC) 40 MG capsule Take 1 capsule (40 mg total) by mouth daily Open capsules into apple sauce or yogurt prior to ingestion 90 capsule 0    amphetamine-dextroamphetamine (ADDERALL) 15 MG tablet Take 1 tablet by mouth 2 (two) times a day (Patient not taking: Reported on 9/6/2024)       No current facility-administered medications for this visit.     Allergies   Allergen Reactions    Bupropion Rash  "      Objective   Vitals: Blood pressure 118/70, pulse (!) 124, height 5' 11\" (1.803 m), weight 59 kg (130 lb), SpO2 98%.    Physical Exam  Vitals reviewed.   Constitutional:       Appearance: He is well-developed.      Comments: Thin build   HENT:      Head: Normocephalic and atraumatic.      Nose: Nose normal.   Eyes:      Conjunctiva/sclera: Conjunctivae normal.      Pupils: Pupils are equal, round, and reactive to light.   Cardiovascular:      Rate and Rhythm: Normal rate and regular rhythm.      Heart sounds: Normal heart sounds.   Pulmonary:      Effort: Pulmonary effort is normal.      Breath sounds: Normal breath sounds.   Abdominal:      General: Bowel sounds are normal.      Palpations: Abdomen is soft.   Musculoskeletal:         General: Normal range of motion.      Cervical back: Normal range of motion and neck supple.   Skin:     General: Skin is warm and dry.   Neurological:      Mental Status: He is alert and oriented to person, place, and time.   Psychiatric:         Behavior: Behavior normal.         Thought Content: Thought content normal.         Judgment: Judgment normal.         Lab Results:   Lab Results   Component Value Date/Time    Hemoglobin A1C 5.9 (H) 05/29/2024 09:02 AM    Hemoglobin A1C 5.8 (H) 02/19/2024 10:19 AM    Hemoglobin A1C 5.7 (H) 11/09/2023 07:45 AM    White Blood Cell Count 4.3 05/29/2024 09:02 AM    White Blood Cell Count 4.1 11/09/2023 07:45 AM    Hemoglobin 14.9 05/29/2024 09:02 AM    Hemoglobin 16.6 11/09/2023 07:45 AM    HCT 44.0 05/29/2024 09:02 AM    HCT 49.1 11/09/2023 07:45 AM    MCV 86 05/29/2024 09:02 AM    MCV 86 11/09/2023 07:45 AM    Platelet Count 159 05/29/2024 09:02 AM    Platelet Count 221 11/09/2023 07:45 AM    BUN 20 05/29/2024 09:02 AM    BUN 19 02/19/2024 10:19 AM    BUN 19 11/09/2023 07:45 AM    Potassium 4.1 05/29/2024 09:02 AM    Potassium 4.3 02/19/2024 10:19 AM    Potassium 4.9 11/09/2023 07:45 AM    Chloride 101 05/29/2024 09:02 AM    Chloride 99 " "02/19/2024 10:19 AM    Chloride 100 11/09/2023 07:45 AM    CO2 24 05/29/2024 09:02 AM    CO2 22 02/19/2024 10:19 AM    CO2 25 11/09/2023 07:45 AM    Creatinine 0.96 05/29/2024 09:02 AM    Creatinine 0.93 02/19/2024 10:19 AM    Creatinine 1.02 11/09/2023 07:45 AM    AST 12 05/29/2024 09:02 AM    AST 13 02/19/2024 10:19 AM    AST 14 11/09/2023 07:45 AM    ALT 13 05/29/2024 09:02 AM    ALT 18 02/19/2024 10:19 AM    ALT 13 11/09/2023 07:45 AM    Protein, Total 6.9 05/29/2024 09:02 AM    Protein, Total 7.5 02/19/2024 10:19 AM    Protein, Total 7.2 11/09/2023 07:45 AM    Albumin 4.5 05/29/2024 09:02 AM    Albumin 5.1 02/19/2024 10:19 AM    Albumin 4.9 11/09/2023 07:45 AM    Globulin, Total 2.4 05/29/2024 09:02 AM    Globulin, Total 2.4 02/19/2024 10:19 AM    Globulin, Total 2.3 11/09/2023 07:45 AM    HDL 45 05/29/2024 09:02 AM    HDL 35 (L) 11/09/2023 07:45 AM    Triglycerides 101 05/29/2024 09:02 AM    Triglycerides 97 11/09/2023 07:45 AM       Portions of the record may have been created with voice recognition software. Occasional wrong word or \"sound a like\" substitutions may have occurred due to the inherent limitations of voice recognition software. Read the chart carefully and recognize, using context, where substitutions have occurred.    "

## 2024-09-12 ENCOUNTER — HOSPITAL ENCOUNTER (OUTPATIENT)
Dept: NUCLEAR MEDICINE | Facility: HOSPITAL | Age: 24
End: 2024-09-12
Attending: INTERNAL MEDICINE
Payer: COMMERCIAL

## 2024-09-12 DIAGNOSIS — R10.10 UPPER ABDOMINAL PAIN: ICD-10-CM

## 2024-09-12 PROCEDURE — A9541 TC99M SULFUR COLLOID: HCPCS

## 2024-09-16 DIAGNOSIS — E10.9 TYPE 1 DIABETES MELLITUS WITHOUT COMPLICATION (HCC): ICD-10-CM

## 2024-09-16 RX ORDER — INSULIN LISPRO 100 [IU]/ML
INJECTION, SOLUTION INTRAVENOUS; SUBCUTANEOUS
Qty: 50 ML | Refills: 1 | Status: SHIPPED | OUTPATIENT
Start: 2024-09-16

## 2024-09-17 ENCOUNTER — HOSPITAL ENCOUNTER (OUTPATIENT)
Dept: NUCLEAR MEDICINE | Facility: HOSPITAL | Age: 24
Discharge: HOME | End: 2024-09-17
Payer: COMMERCIAL

## 2024-09-17 DIAGNOSIS — R11.0 NAUSEA: ICD-10-CM

## 2024-09-17 PROCEDURE — 78264 GASTRIC EMPTYING IMG STUDY: CPT

## 2024-09-17 PROCEDURE — A9541 TC99M SULFUR COLLOID: HCPCS

## 2024-09-24 DIAGNOSIS — K30 DELAYED GASTRIC EMPTYING: Primary | ICD-10-CM

## 2024-09-24 RX ORDER — METOCLOPRAMIDE 5 MG/1
5 TABLET ORAL 4 TIMES DAILY
Qty: 120 TABLET | Refills: 0 | Status: SHIPPED | OUTPATIENT
Start: 2024-09-24 | End: 2024-09-27 | Stop reason: SDUPTHER

## 2024-09-26 ENCOUNTER — PATIENT MESSAGE (OUTPATIENT)
Dept: ENDOCRINOLOGY | Facility: HOSPITAL | Age: 24
End: 2024-09-26

## 2024-09-26 ENCOUNTER — TELEPHONE (OUTPATIENT)
Age: 24
End: 2024-09-26

## 2024-09-26 DIAGNOSIS — K30 DELAYED GASTRIC EMPTYING: ICD-10-CM

## 2024-09-26 RX ORDER — METOCLOPRAMIDE 5 MG/1
5 TABLET ORAL 4 TIMES DAILY
Qty: 120 TABLET | Refills: 0 | Status: CANCELLED | OUTPATIENT
Start: 2024-09-26

## 2024-09-26 NOTE — TELEPHONE ENCOUNTER
Pt and mother transferred in. Mother is concerned with pt's ongoing nausea and difficulty eating. They were unaware Reglan Rx was sent. Reviewed indication for med.    They do not want to use [facility] mail order. Rx pended for [facility]. Please cancel Rx as they will have issues picking up at [facility] due to insurance.     Mother is requesting a call from Dr. Sanford.     Cecilia [phone]

## 2024-09-27 DIAGNOSIS — K30 DELAYED GASTRIC EMPTYING: ICD-10-CM

## 2024-09-27 RX ORDER — METOCLOPRAMIDE 5 MG/1
5 TABLET ORAL 4 TIMES DAILY
Qty: 120 TABLET | Refills: 0 | Status: SHIPPED | OUTPATIENT
Start: 2024-09-27

## 2024-10-18 ENCOUNTER — TELEPHONE (OUTPATIENT)
Age: 24
End: 2024-10-18

## 2024-10-18 NOTE — TELEPHONE ENCOUNTER
Patients mom calling in today states that Dr. Sanford promised a phone call and they have yet to hear from him.  State that the test was preformed in [place] and that Elroy wrote a lengthy my chart message that Dr. Sanford didn't respond to.  The would like Dr. Sanford to call them directly as she states that her son is suffering and this is just dragging it out.

## 2024-10-27 DIAGNOSIS — K30 DELAYED GASTRIC EMPTYING: ICD-10-CM

## 2024-10-28 RX ORDER — OMEPRAZOLE 40 MG/1
40 CAPSULE, DELAYED RELEASE ORAL DAILY
Qty: 90 CAPSULE | Refills: 1 | Status: SHIPPED | OUTPATIENT
Start: 2024-10-28 | End: 2024-11-06

## 2024-11-06 ENCOUNTER — OFFICE VISIT (OUTPATIENT)
Dept: GASTROENTEROLOGY | Facility: CLINIC | Age: 24
End: 2024-11-06
Payer: COMMERCIAL

## 2024-11-06 ENCOUNTER — TELEPHONE (OUTPATIENT)
Dept: GASTROENTEROLOGY | Facility: CLINIC | Age: 24
End: 2024-11-06

## 2024-11-06 VITALS
DIASTOLIC BLOOD PRESSURE: 69 MMHG | WEIGHT: 131 LBS | HEIGHT: 71 IN | SYSTOLIC BLOOD PRESSURE: 106 MMHG | BODY MASS INDEX: 18.34 KG/M2

## 2024-11-06 DIAGNOSIS — K30 DELAYED GASTRIC EMPTYING: Primary | ICD-10-CM

## 2024-11-06 PROCEDURE — 99214 OFFICE O/P EST MOD 30 MIN: CPT | Performed by: INTERNAL MEDICINE

## 2024-11-06 NOTE — TELEPHONE ENCOUNTER
Scheduled date of EGD(as of today):11/25/24  Physician performing EGD:Sina  Location of EGD:[facility]  Instructions reviewed with patient by:Verbal and folder - DS  Clearances: NONE, PT is a diabetic     EGD will be with BOTOX

## 2024-11-06 NOTE — H&P (VIEW-ONLY)
[facility] - Outpatient Follow-up Note  Elroy Salcido 24 y.o. male MRN: [id number]  Encounter: [id number]    ASSESSMENT AND PLAN:      1. Delayed gastric emptying  Not confirmed by emptying scan, however it was done with protein shake instead of solid food.  Because of the endoscopy finding of retained food, I do think he has delayed emptying.  I will repeat the endoscopy to be sure there is no outlet obstruction and to inject Botox.  I have also started Motegrity.  In addition to that, I have referred them to Dr. Cota at [place]  - Prucalopride Succinate 2 MG TABS; Take 2 mg by mouth in the morning  Dispense: 30 tablet; Refill: 1  - Ambulatory Referral to Gastroenterology; Future  - EGD; Future      Follow up appointment: For upper endoscopy and Botox    _______________________      Chief Complaint   Patient presents with    Review gastric emptying       HPI:   Patient is a 24 y.o. male with a significant PMH of delayed gastric emptying and diabetes presenting for follow up regarding continued issues with lack of appetite and nausea and pain with food.  He has been on metoclopramide 4 times a day without any success at all    Historical Information   Past Medical History:   Diagnosis Date    ADHD     Anxiety     Depression     Diabetes 1.5, managed as type 1 (HCC)      Past Surgical History:   Procedure Laterality Date    WISDOM TOOTH EXTRACTION       Social History     Substance and Sexual Activity   Alcohol Use Not Currently     Social History     Substance and Sexual Activity   Drug Use Yes    Frequency: 0.5 times per week    Types: Marijuana    Comment: D8     Social History     Tobacco Use   Smoking Status Never   Smokeless Tobacco Never     Family History   Problem Relation Age of Onset    Migraines Mother     Breast cancer Mother     Heart disease Father     Addiction problem Father     Diabetes unspecified Father     Pancreatitis Father     No Known Problems  "Sister     Heart disease Maternal Grandmother     Heart disease Maternal Grandfather     Prostate cancer Maternal Grandfather     Colon cancer Neg Hx     Colon polyps Neg Hx          Current Outpatient Medications:     Accu-Chek FastClix Lancets MISC    acetone, urine, test (Ketostix) strip    amphetamine-dextroamphetamine (ADDERALL) 20 mg tablet    Blood Glucose Monitoring Suppl (FreeStyle Lite) w/Device KIT    Cholecalciferol (Vitamin D3) 125 MCG (5000 UT) CAPS    Continuous Blood Gluc  (Dexcom G7 ) IRMA    glucose blood (FREESTYLE LITE) test strip    Insulin Disposable Pump (Omnipod DASH Pods, Gen 4,) MISC    insulin lispro (HumaLOG) 100 units/mL injection    Insulin Pen Needle (BD Pen Needle Ibis U/F) 32G X 4 MM MISC    L-Methylfolate 15 MG TABS    Prucalopride Succinate 2 MG TABS    amphetamine-dextroamphetamine (ADDERALL) 15 MG tablet    Continuous Glucose Sensor (Dexcom G7 Sensor)    DULoxetine (CYMBALTA) 20 mg capsule    Glucagon (Baqsimi One Pack) 3 MG/DOSE POWD  Allergies   Allergen Reactions    Bupropion Rash     Reviewed medications and allergies and updated as indicated    PHYSICAL EXAM:    Blood pressure 106/69, height 5' 11\" (1.803 m), weight 59.4 kg (131 lb). Body mass index is 18.27 kg/m².  General Appearance: NAD, cooperative, alert  Eyes: Anicteric  GI:  Soft, non-tender, non-distended; normal bowel sounds; no masses, no organomegaly   Rectal: Deferred  Musculoskeletal: No edema.  Skin:  No jaundice    Lab Results:   Lab Results   Component Value Date    WBC 4.3 05/29/2024    WBC 4.1 11/09/2023    WBC 6.9 08/02/2023    HGB 14.9 05/29/2024    HGB 16.6 11/09/2023    HGB 15.0 08/02/2023    MCV 86 05/29/2024     05/29/2024     11/09/2023     08/02/2023     Lab Results   Component Value Date    K 4.1 05/29/2024     05/29/2024    CO2 24 05/29/2024    BUN 20 05/29/2024    CREATININE 0.96 05/29/2024    AST 12 05/29/2024    AST 13 02/19/2024    AST 14 11/09/2023 " "   ALT 13 05/29/2024    ALT 18 02/19/2024    ALT 13 11/09/2023    EGFR 114 05/29/2024     Lab Results   Component Value Date    IRON 131 05/04/2022    TIBC 292 05/04/2022    FERRITIN 125 05/04/2022     No results found for: \"LIPASE\"    Radiology Results:   No results found.  "

## 2024-11-06 NOTE — PROGRESS NOTES
[facility] - Outpatient Follow-up Note  Elroy Salcido 24 y.o. male MRN: [id number]  Encounter: [id number]    ASSESSMENT AND PLAN:      1. Delayed gastric emptying  Not confirmed by emptying scan, however it was done with protein shake instead of solid food.  Because of the endoscopy finding of retained food, I do think he has delayed emptying.  I will repeat the endoscopy to be sure there is no outlet obstruction and to inject Botox.  I have also started Motegrity.  In addition to that, I have referred them to Dr. Cota at [place]  - Prucalopride Succinate 2 MG TABS; Take 2 mg by mouth in the morning  Dispense: 30 tablet; Refill: 1  - Ambulatory Referral to Gastroenterology; Future  - EGD; Future      Follow up appointment: For upper endoscopy and Botox    _______________________      Chief Complaint   Patient presents with    Review gastric emptying       HPI:   Patient is a 24 y.o. male with a significant PMH of delayed gastric emptying and diabetes presenting for follow up regarding continued issues with lack of appetite and nausea and pain with food.  He has been on metoclopramide 4 times a day without any success at all    Historical Information   Past Medical History:   Diagnosis Date    ADHD     Anxiety     Depression     Diabetes 1.5, managed as type 1 (HCC)      Past Surgical History:   Procedure Laterality Date    WISDOM TOOTH EXTRACTION       Social History     Substance and Sexual Activity   Alcohol Use Not Currently     Social History     Substance and Sexual Activity   Drug Use Yes    Frequency: 0.5 times per week    Types: Marijuana    Comment: D8     Social History     Tobacco Use   Smoking Status Never   Smokeless Tobacco Never     Family History   Problem Relation Age of Onset    Migraines Mother     Breast cancer Mother     Heart disease Father     Addiction problem Father     Diabetes unspecified Father     Pancreatitis Father     No Known Problems  "Sister     Heart disease Maternal Grandmother     Heart disease Maternal Grandfather     Prostate cancer Maternal Grandfather     Colon cancer Neg Hx     Colon polyps Neg Hx          Current Outpatient Medications:     Accu-Chek FastClix Lancets MISC    acetone, urine, test (Ketostix) strip    amphetamine-dextroamphetamine (ADDERALL) 20 mg tablet    Blood Glucose Monitoring Suppl (FreeStyle Lite) w/Device KIT    Cholecalciferol (Vitamin D3) 125 MCG (5000 UT) CAPS    Continuous Blood Gluc  (Dexcom G7 ) IRMA    glucose blood (FREESTYLE LITE) test strip    Insulin Disposable Pump (Omnipod DASH Pods, Gen 4,) MISC    insulin lispro (HumaLOG) 100 units/mL injection    Insulin Pen Needle (BD Pen Needle Ibis U/F) 32G X 4 MM MISC    L-Methylfolate 15 MG TABS    Prucalopride Succinate 2 MG TABS    amphetamine-dextroamphetamine (ADDERALL) 15 MG tablet    Continuous Glucose Sensor (Dexcom G7 Sensor)    DULoxetine (CYMBALTA) 20 mg capsule    Glucagon (Baqsimi One Pack) 3 MG/DOSE POWD  Allergies   Allergen Reactions    Bupropion Rash     Reviewed medications and allergies and updated as indicated    PHYSICAL EXAM:    Blood pressure 106/69, height 5' 11\" (1.803 m), weight 59.4 kg (131 lb). Body mass index is 18.27 kg/m².  General Appearance: NAD, cooperative, alert  Eyes: Anicteric  GI:  Soft, non-tender, non-distended; normal bowel sounds; no masses, no organomegaly   Rectal: Deferred  Musculoskeletal: No edema.  Skin:  No jaundice    Lab Results:   Lab Results   Component Value Date    WBC 4.3 05/29/2024    WBC 4.1 11/09/2023    WBC 6.9 08/02/2023    HGB 14.9 05/29/2024    HGB 16.6 11/09/2023    HGB 15.0 08/02/2023    MCV 86 05/29/2024     05/29/2024     11/09/2023     08/02/2023     Lab Results   Component Value Date    K 4.1 05/29/2024     05/29/2024    CO2 24 05/29/2024    BUN 20 05/29/2024    CREATININE 0.96 05/29/2024    AST 12 05/29/2024    AST 13 02/19/2024    AST 14 11/09/2023 " "   ALT 13 05/29/2024    ALT 18 02/19/2024    ALT 13 11/09/2023    EGFR 114 05/29/2024     Lab Results   Component Value Date    IRON 131 05/04/2022    TIBC 292 05/04/2022    FERRITIN 125 05/04/2022     No results found for: \"LIPASE\"    Radiology Results:   No results found.  "

## 2024-11-07 ENCOUNTER — TELEPHONE (OUTPATIENT)
Age: 24
End: 2024-11-07

## 2024-11-07 NOTE — TELEPHONE ENCOUNTER
PA for Motegrity 2 mg   SUBMITTED to [facility]    via    []CMM-KEY:   [x]Surescripts-Case ID #     [id number]    Payer: [facility] [employer]   Phone: [phone]   Fax: [phone]     []Availity-Auth ID # NDC #   []Faxed to plan   []Other website   []Phone call Case ID #     []PA sent as URGENT    All office notes, labs and other pertaining documents and studies sent. Clinical questions answered. Awaiting determination from insurance company.     Turnaround time for your insurance to make a decision on your Prior Authorization can take 7-21 business days.

## 2024-11-08 NOTE — TELEPHONE ENCOUNTER
PA for Motegrity DENIED    Reason:(Screenshot if applicable)        Message sent to office clinical pool Yes    Denial letter scanned into Media Yes    Appeal started No (Provider will need to decide if appeal is warranted and send clinical documentation to Prior Authorization Team for initiation.)    **Please follow up with your patient regarding denial and next steps**

## 2024-11-15 ENCOUNTER — PATIENT MESSAGE (OUTPATIENT)
Dept: GASTROENTEROLOGY | Facility: CLINIC | Age: 24
End: 2024-11-15

## 2024-11-18 ENCOUNTER — TELEPHONE (OUTPATIENT)
Dept: GASTROENTEROLOGY | Facility: CLINIC | Age: 24
End: 2024-11-18

## 2024-11-19 ENCOUNTER — TELEPHONE (OUTPATIENT)
Age: 24
End: 2024-11-19

## 2024-11-19 NOTE — TELEPHONE ENCOUNTER
Please see other encounter  Motegrity was denied and GI office was notified.  Thank you      11/8/24  3:05 PM  Note     PA for Motegrity DENIED     Reason:(Screenshot if applicable)          Message sent to office clinical pool Yes     Denial letter scanned into Media Yes     Appeal started No (Provider will need to decide if appeal is warranted and send clinical documentation to Prior Authorization Team for initiation.)     **Please follow up with your patient regarding denial and next steps**

## 2024-11-24 RX ORDER — SODIUM CHLORIDE, SODIUM LACTATE, POTASSIUM CHLORIDE, CALCIUM CHLORIDE 600; 310; 30; 20 MG/100ML; MG/100ML; MG/100ML; MG/100ML
50 INJECTION, SOLUTION INTRAVENOUS CONTINUOUS
Status: CANCELLED | OUTPATIENT
Start: 2024-11-24

## 2024-11-25 ENCOUNTER — ANESTHESIA (OUTPATIENT)
Dept: ANESTHESIOLOGY | Facility: HOSPITAL | Age: 24
End: 2024-11-25

## 2024-11-25 ENCOUNTER — ANESTHESIA (OUTPATIENT)
Dept: GASTROENTEROLOGY | Facility: HOSPITAL | Age: 24
End: 2024-11-25
Payer: COMMERCIAL

## 2024-11-25 ENCOUNTER — ANESTHESIA EVENT (OUTPATIENT)
Dept: GASTROENTEROLOGY | Facility: HOSPITAL | Age: 24
End: 2024-11-25
Payer: COMMERCIAL

## 2024-11-25 ENCOUNTER — ANESTHESIA EVENT (OUTPATIENT)
Dept: ANESTHESIOLOGY | Facility: HOSPITAL | Age: 24
End: 2024-11-25

## 2024-11-25 ENCOUNTER — HOSPITAL ENCOUNTER (OUTPATIENT)
Dept: GASTROENTEROLOGY | Facility: HOSPITAL | Age: 24
Setting detail: OUTPATIENT SURGERY
Discharge: HOME | End: 2024-11-25
Attending: INTERNAL MEDICINE
Payer: COMMERCIAL

## 2024-11-25 VITALS
BODY MASS INDEX: 18.13 KG/M2 | TEMPERATURE: 98.7 F | WEIGHT: 130 LBS | SYSTOLIC BLOOD PRESSURE: 100 MMHG | DIASTOLIC BLOOD PRESSURE: 63 MMHG | RESPIRATION RATE: 12 BRPM | OXYGEN SATURATION: 96 % | HEART RATE: 96 BPM

## 2024-11-25 DIAGNOSIS — K30 DELAYED GASTRIC EMPTYING: ICD-10-CM

## 2024-11-25 LAB — GLUCOSE SERPL-MCNC: 127 MG/DL (ref 65–140)

## 2024-11-25 PROCEDURE — 43236 UPPR GI SCOPE W/SUBMUC INJ: CPT | Performed by: INTERNAL MEDICINE

## 2024-11-25 PROCEDURE — 82948 REAGENT STRIP/BLOOD GLUCOSE: CPT

## 2024-11-25 RX ORDER — SERTRALINE HYDROCHLORIDE 150 MG/1
150 CAPSULE ORAL DAILY
COMMUNITY

## 2024-11-25 RX ORDER — SODIUM CHLORIDE 9 MG/ML
75 INJECTION, SOLUTION INTRAVENOUS CONTINUOUS
Status: CANCELLED | OUTPATIENT
Start: 2024-11-25

## 2024-11-25 RX ORDER — PROPOFOL 10 MG/ML
INJECTION, EMULSION INTRAVENOUS AS NEEDED
Status: DISCONTINUED | OUTPATIENT
Start: 2024-11-25 | End: 2024-11-25

## 2024-11-25 RX ORDER — SODIUM CHLORIDE, SODIUM LACTATE, POTASSIUM CHLORIDE, CALCIUM CHLORIDE 600; 310; 30; 20 MG/100ML; MG/100ML; MG/100ML; MG/100ML
50 INJECTION, SOLUTION INTRAVENOUS CONTINUOUS
Status: DISCONTINUED | OUTPATIENT
Start: 2024-11-25 | End: 2024-11-29 | Stop reason: HOSPADM

## 2024-11-25 RX ORDER — MIDAZOLAM HYDROCHLORIDE 2 MG/2ML
INJECTION, SOLUTION INTRAMUSCULAR; INTRAVENOUS AS NEEDED
Status: DISCONTINUED | OUTPATIENT
Start: 2024-11-25 | End: 2024-11-25

## 2024-11-25 RX ORDER — SODIUM CHLORIDE 9 MG/ML
INJECTION, SOLUTION INTRAVENOUS CONTINUOUS PRN
Status: DISCONTINUED | OUTPATIENT
Start: 2024-11-25 | End: 2024-11-25

## 2024-11-25 RX ADMIN — PROPOFOL 100 MG: 10 INJECTION, EMULSION INTRAVENOUS at 12:54

## 2024-11-25 RX ADMIN — PROPOFOL 100 MG: 10 INJECTION, EMULSION INTRAVENOUS at 12:52

## 2024-11-25 RX ADMIN — ONABOTULINUMTOXINA 100 UNITS: 100 INJECTION, POWDER, LYOPHILIZED, FOR SOLUTION INTRADERMAL; INTRAMUSCULAR at 12:57

## 2024-11-25 RX ADMIN — PROPOFOL 50 MG: 10 INJECTION, EMULSION INTRAVENOUS at 12:56

## 2024-11-25 RX ADMIN — SODIUM CHLORIDE: 0.9 INJECTION, SOLUTION INTRAVENOUS at 12:43

## 2024-11-25 RX ADMIN — PROPOFOL 150 MG: 10 INJECTION, EMULSION INTRAVENOUS at 12:50

## 2024-11-25 RX ADMIN — MIDAZOLAM 2 MG: 1 INJECTION INTRAMUSCULAR; INTRAVENOUS at 12:47

## 2024-11-25 NOTE — ANESTHESIA PREPROCEDURE EVALUATION
Procedure:  EGD    Relevant Problems   ENDO   (+) Type 1 diabetes mellitus without complication (HCC)      NEURO/PSYCH   (+) Anxiety   (+) Depression        Physical Exam    Airway    Mallampati score: II  TM Distance: >3 FB  Neck ROM: full     Dental   No notable dental hx     Cardiovascular  Cardiovascular exam normal    Pulmonary  Pulmonary exam normal     Other Findings        Anesthesia Plan  ASA Score- 2     Anesthesia Type- IV sedation with anesthesia with ASA Monitors.         Additional Monitors:     Airway Plan:     Comment: Pt set his pump to 70 % and b sugar now is 160 mg/dl.       Plan Factors-Exercise tolerance (METS): >4 METS.    Chart reviewed.   Existing labs reviewed. Patient summary reviewed.    Patient is not a current smoker. Patient not instructed to abstain from smoking on day of procedure. Patient did not smoke on day of surgery.            Induction-     Postoperative Plan-     Perioperative Resuscitation Plan - Level 1 - Full Code.       Informed Consent- Anesthetic plan and risks discussed with patient.  I personally reviewed this patient with the CRNA. Discussed and agreed on the Anesthesia Plan with the CRNA..        Lab Results   Component Value Date    HGBA1C 5.9 (H) 05/29/2024       Lab Results   Component Value Date    K 4.1 05/29/2024     05/29/2024    CO2 24 05/29/2024    BUN 20 05/29/2024    CREATININE 0.96 05/29/2024    AST 12 05/29/2024    ALT 13 05/29/2024    EGFR 114 05/29/2024       Lab Results   Component Value Date    WBC 4.3 05/29/2024    HGB 14.9 05/29/2024    HCT 44.0 05/29/2024    MCV 86 05/29/2024     05/29/2024

## 2024-11-25 NOTE — ANESTHESIA PREPROCEDURE EVALUATION
Procedure:  PRE-OP ONLY    Relevant Problems   ENDO   (+) Type 1 diabetes mellitus without complication (HCC)      NEURO/PSYCH   (+) Anxiety   (+) Depression      Behavioral Health   (+) ADHD             Anesthesia Plan  ASA Score- 2     Anesthesia Type- IV sedation with anesthesia with ASA Monitors.         Additional Monitors:     Airway Plan:            Plan Factors-    Chart reviewed.   Existing labs reviewed. Patient summary reviewed.                  Induction-     Postoperative Plan-     Perioperative Resuscitation Plan - Level 1 - Full Code.       Informed Consent- Anesthetic plan and risks discussed with patient.  I personally reviewed this patient with the CRNA. Discussed and agreed on the Anesthesia Plan with the CRNA..        Lab Results   Component Value Date    HGBA1C 5.9 (H) 05/29/2024       Lab Results   Component Value Date    K 4.1 05/29/2024     05/29/2024    CO2 24 05/29/2024    BUN 20 05/29/2024    CREATININE 0.96 05/29/2024    AST 12 05/29/2024    ALT 13 05/29/2024    EGFR 114 05/29/2024       Lab Results   Component Value Date    WBC 4.3 05/29/2024    HGB 14.9 05/29/2024    HCT 44.0 05/29/2024    MCV 86 05/29/2024     05/29/2024

## 2024-11-25 NOTE — ANESTHESIA POSTPROCEDURE EVALUATION
Post-Op Assessment Note    CV Status:  Stable  Pain Score: 0    Pain management: adequate       Mental Status:  Sleepy   Hydration Status:  Euvolemic   PONV Controlled:  Controlled   Airway Patency:  Patent     Post Op Vitals Reviewed: Yes    No anethesia notable event occurred.    Staff: Anesthesiologist, CRNA           Last Filed PACU Vitals:  Vitals Value Taken Time   Temp     Pulse 126    /76    Resp 18    SpO2 93        Modified Jessy:  Activity: 2 (11/25/2024 12:00 PM)  Respiration: 2 (11/25/2024 12:00 PM)  Circulation: 2 (11/25/2024 12:00 PM)  Consciousness: 2 (11/25/2024 12:00 PM)  Oxygen Saturation: 2 (11/25/2024 12:00 PM)  Modified Jessy Score: 10 (11/25/2024 12:00 PM)

## 2024-11-25 NOTE — INTERVAL H&P NOTE
H&P reviewed. After examining the patient I find no changes in the patients condition since the H&P had been written.    Vitals:    11/25/24 1156   BP: 118/67   Pulse: (!) 108   Resp: 16   Temp: 98.7 °F (37.1 °C)   SpO2: 95%

## 2025-01-22 ENCOUNTER — TELEPHONE (OUTPATIENT)
Dept: ENDOCRINOLOGY | Facility: HOSPITAL | Age: 25
End: 2025-01-22

## 2025-01-22 ENCOUNTER — OFFICE VISIT (OUTPATIENT)
Dept: ENDOCRINOLOGY | Facility: HOSPITAL | Age: 25
End: 2025-01-22
Payer: COMMERCIAL

## 2025-01-22 VITALS
HEART RATE: 118 BPM | DIASTOLIC BLOOD PRESSURE: 80 MMHG | WEIGHT: 129.6 LBS | SYSTOLIC BLOOD PRESSURE: 124 MMHG | HEIGHT: 71 IN | BODY MASS INDEX: 18.15 KG/M2

## 2025-01-22 DIAGNOSIS — E55.9 VITAMIN D DEFICIENCY: ICD-10-CM

## 2025-01-22 DIAGNOSIS — E10.9 TYPE 1 DIABETES MELLITUS WITHOUT COMPLICATION (HCC): Primary | ICD-10-CM

## 2025-01-22 DIAGNOSIS — R73.9 HYPERGLYCEMIA: ICD-10-CM

## 2025-01-22 LAB — SL AMB POCT HEMOGLOBIN AIC: 6 (ref ?–6.5)

## 2025-01-22 PROCEDURE — 95251 CONT GLUC MNTR ANALYSIS I&R: CPT | Performed by: NURSE PRACTITIONER

## 2025-01-22 PROCEDURE — 99214 OFFICE O/P EST MOD 30 MIN: CPT | Performed by: NURSE PRACTITIONER

## 2025-01-22 PROCEDURE — 83036 HEMOGLOBIN GLYCOSYLATED A1C: CPT | Performed by: NURSE PRACTITIONER

## 2025-01-22 RX ORDER — ACYCLOVIR 400 MG/1
1 TABLET ORAL
Qty: 9 EACH | Refills: 3 | Status: SHIPPED | OUTPATIENT
Start: 2025-01-22

## 2025-01-22 RX ORDER — INSULIN LISPRO 100 [IU]/ML
INJECTION, SOLUTION INTRAVENOUS; SUBCUTANEOUS
Qty: 50 ML | Refills: 1 | Status: SHIPPED | OUTPATIENT
Start: 2025-01-22

## 2025-01-22 RX ORDER — CARIPRAZINE 1.5 MG/1
1 CAPSULE, GELATIN COATED ORAL DAILY
COMMUNITY
Start: 2025-01-17

## 2025-01-22 RX ORDER — PRUCALOPRIDE 2 MG/1
1 TABLET, FILM COATED ORAL DAILY
COMMUNITY

## 2025-01-22 NOTE — TELEPHONE ENCOUNTER
I called and left messages on both the patient's phone as well as with his mother to have him send us information concerning his omnipod for his appointment today.

## 2025-01-22 NOTE — PATIENT INSTRUCTIONS
Stay active and stay hydrated.     Keep up the good work !!!!!!!!!     Continue to utilize the OmniPod and the Dexcom continuous glucose monitor.     We will contact you with the results of your recent lab work when they are sent to the office and reviewed.    Continue to follow up with GI.

## 2025-01-22 NOTE — PROGRESS NOTES
Elroy Salcido 24 y.o. male MRN: [id number]    Encounter: [id number]      Assessment & Plan     Assessment:  This is a 24 y.o.-year-old male with type 1 diabetes with hyperglycemia and vitamin-D deficiency.     Plan:  1. Type 1 diabetes: Hemoglobin A1c POCT is 6.0. Review of his Dexcom continuous glucose monitor and his Omnipod DASH pump download, he is relatively well controlled. For now, his insulin pump settings will remain the same. Reviewed recognition and proper treatment of hypoglycemic episodes.  He will contact the office with any problems.  He is having some anxiety related to eating and the resulting blood sugars.  He is also following up with GI.  we have discussed a follow-up with medical nutrition therapy for some help with his diet.  Check hemoglobin A1c and comprehensive metabolic panel prior to next visit.     2.  Vitamin-D deficiency: Continue supplementation with 2000 units of vitamin D3 daily.  Will continue to monitor levels over time.    CC: Type 1 Diabetes follow up    History of Present Illness     HPI:  24 y.o. year old male with type 1 diabetes for approximately 5 years.  He is on insulin and Omnipod DASH with a Dexcom continuous glucose monitor.      His pump settings are as follows:  Basal  Midnight - 0.65  Insulin to carbohydrate ratio  Midnight - 1:9  Sensitivity - 50  Blood glucose target range - 110     Hemoglobin A1c is not available at the time of the visit for review.  He denies any polyuria, polydipsia, nocturia and blurry vision. He denies neuropathy, nephropathy and retinopathy.  Recent diabetic foot exam was performed in office visit on February 23, 2024.      Hypoglycemic episodes: No, rare. Does have intranasal glucagon in case of severe hypoglycemia.     Blood Sugar/Glucometer/Pump/CGM review: OmniPod DASH pump and Dexcom CGM download from January 9 through January 22, 2025 reveals a average glucose of 145 with a standard deviation of 46.  He is in target range 81% of  the time with 18% elevated readings and less than 2% low readings.  Realizing 23.3 units of insulin per day.  38.6% of which is bolus and 61.4% is basal.     For his vitamin D deficiency, he is not currently supplementing his vitamin D.  His most recent 25-hydroxy vitamin D level from May 29, 2024 is 22.4.     Review of Systems   Constitutional: Negative.  Negative for chills, fatigue and fever.   HENT: Negative.  Negative for trouble swallowing and voice change.    Eyes:  Negative for photophobia, pain, discharge, redness, itching and visual disturbance.   Respiratory:  Negative for cough and shortness of breath.    Cardiovascular:  Negative for chest pain and palpitations.   Gastrointestinal:  Negative for abdominal pain, constipation, diarrhea, nausea and vomiting.   Endocrine: Negative for cold intolerance, heat intolerance, polydipsia, polyphagia and polyuria.   Genitourinary: Negative.    Musculoskeletal: Negative.    Skin: Negative.    Allergic/Immunologic: Negative.    Neurological:  Negative for dizziness, syncope, light-headedness and headaches.   Hematological: Negative.    Psychiatric/Behavioral: Negative.     All other systems reviewed and are negative.      Historical Information   Past Medical History:   Diagnosis Date    ADHD     Anxiety     Depression     Diabetes 1.5, managed as type 1 (HCC)      Past Surgical History:   Procedure Laterality Date    WISDOM TOOTH EXTRACTION       Social History   Social History     Substance and Sexual Activity   Alcohol Use Not Currently     Social History     Substance and Sexual Activity   Drug Use Yes    Frequency: 0.5 times per week    Types: Marijuana    Comment: D8     Social History     Tobacco Use   Smoking Status Never   Smokeless Tobacco Never     Family History:   Family History   Problem Relation Age of Onset    Migraines Mother     Breast cancer Mother     Heart disease Father     Addiction problem Father     Diabetes unspecified Father      Pancreatitis Father     No Known Problems Sister     Heart disease Maternal Grandmother     Heart disease Maternal Grandfather     Prostate cancer Maternal Grandfather     Colon cancer Neg Hx     Colon polyps Neg Hx        Meds/Allergies   Current Outpatient Medications   Medication Sig Dispense Refill    Accu-Chek FastClix Lancets MISC 4 times daily 100 each 3    acetone, urine, test (Ketostix) strip Use prn hyperglycemia 25 each 0    amphetamine-dextroamphetamine (ADDERALL) 20 mg tablet       Blood Glucose Monitoring Suppl (FreeStyle Lite) w/Device KIT Check blood sugars 4 times daily 1 kit 0    Cholecalciferol (Vitamin D3) 125 MCG (5000 UT) CAPS take 1 capsule by mouth once daily 30 capsule 5    Continuous Blood Gluc  (Dexcom G7 ) IRMA Use 1 Device 4 (four) times a day (before meals and at bedtime) 1 each 0    Glucagon (Baqsimi One Pack) 3 MG/DOSE POWD 3 mg prn severe hypoglycemia 2 each 1    glucose blood (FREESTYLE LITE) test strip Check blood sugar 4 times daily 200 strip 6    Insulin Disposable Pump (Omnipod DASH Pods, Gen 4,) MISC Change every 72 hours. 6 each 6    insulin lispro (HumaLOG) 100 units/mL injection Use up to 50 units daily via insulin pump. 50 mL 1    Insulin Pen Needle (BD Pen Needle Ibis U/F) 32G X 4 MM MISC 4 times daily 400 each 3    L-Methylfolate 15 MG TABS Take by mouth      Prucalopride Succinate (Motegrity) 2 MG TABS Take 1 tablet by mouth daily      sertraline (ZOLOFT) 100 mg tablet Take 250 mg by mouth daily      Vraylar 1.5 MG capsule Take 1 capsule by mouth in the morning      Continuous Glucose Sensor (Dexcom G7 Sensor) USE 1 DEVICE CONTINUOUSLY  REPLACE SENSOR EVERY 10 DAYS (Patient not taking: No sig reported) 9 each 3    DULoxetine (CYMBALTA) 20 mg capsule Take 60 mg by mouth daily (Patient not taking: Reported on 11/6/2024)       No current facility-administered medications for this visit.     Allergies   Allergen Reactions    Bupropion Rash       Objective  "  Vitals: Blood pressure 124/80, pulse (!) 118, height 5' 11\" (1.803 m), weight 58.8 kg (129 lb 9.6 oz).    Physical Exam  Vitals reviewed.   Constitutional:       Appearance: He is well-developed.      Comments: Thin Build   HENT:      Head: Normocephalic and atraumatic.      Nose: Nose normal.   Eyes:      Conjunctiva/sclera: Conjunctivae normal.      Pupils: Pupils are equal, round, and reactive to light.   Cardiovascular:      Rate and Rhythm: Normal rate and regular rhythm.      Heart sounds: Normal heart sounds.   Pulmonary:      Effort: Pulmonary effort is normal.      Breath sounds: Normal breath sounds.   Abdominal:      General: Bowel sounds are normal.      Palpations: Abdomen is soft.   Musculoskeletal:         General: Normal range of motion.      Cervical back: Normal range of motion and neck supple.   Skin:     General: Skin is warm and dry.   Neurological:      Mental Status: He is alert and oriented to person, place, and time.   Psychiatric:         Behavior: Behavior normal.         Thought Content: Thought content normal.         Judgment: Judgment normal.         Lab Results:   Lab Results   Component Value Date/Time    Hemoglobin A1C 5.9 (H) 05/29/2024 09:02 AM    Hemoglobin A1C 5.8 (H) 02/19/2024 10:19 AM    White Blood Cell Count 4.3 05/29/2024 09:02 AM    Hemoglobin 14.9 05/29/2024 09:02 AM    HCT 44.0 05/29/2024 09:02 AM    MCV 86 05/29/2024 09:02 AM    Platelet Count 159 05/29/2024 09:02 AM    BUN 20 05/29/2024 09:02 AM    BUN 19 02/19/2024 10:19 AM    Potassium 4.1 05/29/2024 09:02 AM    Potassium 4.3 02/19/2024 10:19 AM    Chloride 101 05/29/2024 09:02 AM    Chloride 99 02/19/2024 10:19 AM    CO2 24 05/29/2024 09:02 AM    CO2 22 02/19/2024 10:19 AM    Creatinine 0.96 05/29/2024 09:02 AM    Creatinine 0.93 02/19/2024 10:19 AM    AST 12 05/29/2024 09:02 AM    AST 13 02/19/2024 10:19 AM    ALT 13 05/29/2024 09:02 AM    ALT 18 02/19/2024 10:19 AM    Protein, Total 6.9 05/29/2024 09:02 AM    " "Protein, Total 7.5 02/19/2024 10:19 AM    Albumin 4.5 05/29/2024 09:02 AM    Albumin 5.1 02/19/2024 10:19 AM    Globulin, Total 2.4 05/29/2024 09:02 AM    Globulin, Total 2.4 02/19/2024 10:19 AM    HDL 45 05/29/2024 09:02 AM    Triglycerides 101 05/29/2024 09:02 AM         Portions of the record may have been created with voice recognition software. Occasional wrong word or \"sound a like\" substitutions may have occurred due to the inherent limitations of voice recognition software. Read the chart carefully and recognize, using context, where substitutions have occurred.    "

## 2025-02-04 ENCOUNTER — DOCUMENTATION (OUTPATIENT)
Dept: ENDOCRINOLOGY | Facility: HOSPITAL | Age: 25
End: 2025-02-04

## 2025-04-28 ENCOUNTER — TELEPHONE (OUTPATIENT)
Age: 25
End: 2025-04-28

## 2025-04-28 NOTE — TELEPHONE ENCOUNTER
Over the past 24 hours glucose has been averaging quite well with occasional episodes in the 200s.  Do not think symptoms are related to his blood sugars.  Will leave for Ronald to review when he is back in the office.

## 2025-04-28 NOTE — TELEPHONE ENCOUNTER
Patients mother Cecilia called stating over the last week or so Bennett BG has been spiking higher after meal times and just not as regular as it normally is. He does wear his Dexcom and Omnipod, she reports they will send over a download for the doctors review. Also, he has been feeling very fatigued, either very hot or cold, and having stomach pains. They do have labs ordered for his appointment on 5/1 but Cecilia is asking, based off the way he is feeling now does the doctor want any additional labs?  Please advise

## 2025-04-30 ENCOUNTER — RESULTS FOLLOW-UP (OUTPATIENT)
Dept: ENDOCRINOLOGY | Facility: HOSPITAL | Age: 25
End: 2025-04-30

## 2025-04-30 LAB
ALBUMIN SERPL-MCNC: 4.7 G/DL (ref 4.3–5.2)
ALP SERPL-CCNC: 81 IU/L (ref 44–121)
ALT SERPL-CCNC: 22 IU/L (ref 0–44)
AST SERPL-CCNC: 15 IU/L (ref 0–40)
BASOPHILS # BLD AUTO: 0 X10E3/UL (ref 0–0.2)
BASOPHILS NFR BLD AUTO: 1 %
BILIRUB SERPL-MCNC: 0.3 MG/DL (ref 0–1.2)
BUN SERPL-MCNC: 22 MG/DL (ref 6–20)
BUN/CREAT SERPL: 25 (ref 9–20)
CALCIUM SERPL-MCNC: 9.8 MG/DL (ref 8.7–10.2)
CHLORIDE SERPL-SCNC: 101 MMOL/L (ref 96–106)
CO2 SERPL-SCNC: 23 MMOL/L (ref 20–29)
CREAT SERPL-MCNC: 0.87 MG/DL (ref 0.76–1.27)
EGFR: 124 ML/MIN/1.73
EOSINOPHIL # BLD AUTO: 0.1 X10E3/UL (ref 0–0.4)
EOSINOPHIL NFR BLD AUTO: 1 %
ERYTHROCYTE [DISTWIDTH] IN BLOOD BY AUTOMATED COUNT: 12.1 % (ref 11.6–15.4)
GLOBULIN SER-MCNC: 2 G/DL (ref 1.5–4.5)
GLUCOSE SERPL-MCNC: 124 MG/DL (ref 70–99)
HBA1C MFR BLD: 6.8 % (ref 4.8–5.6)
HCT VFR BLD AUTO: 42.1 % (ref 37.5–51)
HGB BLD-MCNC: 14.7 G/DL (ref 13–17.7)
IMM GRANULOCYTES # BLD: 0.1 X10E3/UL (ref 0–0.1)
IMM GRANULOCYTES NFR BLD: 1 %
LYMPHOCYTES # BLD AUTO: 2.4 X10E3/UL (ref 0.7–3.1)
LYMPHOCYTES NFR BLD AUTO: 40 %
MCH RBC QN AUTO: 29.8 PG (ref 26.6–33)
MCHC RBC AUTO-ENTMCNC: 34.9 G/DL (ref 31.5–35.7)
MCV RBC AUTO: 85 FL (ref 79–97)
MONOCYTES # BLD AUTO: 0.4 X10E3/UL (ref 0.1–0.9)
MONOCYTES NFR BLD AUTO: 7 %
NEUTROPHILS # BLD AUTO: 3 X10E3/UL (ref 1.4–7)
NEUTROPHILS NFR BLD AUTO: 50 %
PLATELET # BLD AUTO: 195 X10E3/UL (ref 150–450)
POTASSIUM SERPL-SCNC: 4.3 MMOL/L (ref 3.5–5.2)
PROT SERPL-MCNC: 6.7 G/DL (ref 6–8.5)
RBC # BLD AUTO: 4.94 X10E6/UL (ref 4.14–5.8)
SODIUM SERPL-SCNC: 141 MMOL/L (ref 134–144)
TSH SERPL DL<=0.005 MIU/L-ACNC: 0.92 UIU/ML (ref 0.45–4.5)
WBC # BLD AUTO: 6 X10E3/UL (ref 3.4–10.8)

## 2025-05-01 ENCOUNTER — OFFICE VISIT (OUTPATIENT)
Dept: ENDOCRINOLOGY | Facility: HOSPITAL | Age: 25
End: 2025-05-01
Payer: COMMERCIAL

## 2025-05-01 VITALS
HEIGHT: 71 IN | BODY MASS INDEX: 20.55 KG/M2 | SYSTOLIC BLOOD PRESSURE: 114 MMHG | DIASTOLIC BLOOD PRESSURE: 80 MMHG | HEART RATE: 92 BPM | WEIGHT: 146.8 LBS

## 2025-05-01 DIAGNOSIS — E55.9 VITAMIN D DEFICIENCY: ICD-10-CM

## 2025-05-01 DIAGNOSIS — R73.9 HYPERGLYCEMIA: ICD-10-CM

## 2025-05-01 DIAGNOSIS — E10.9 TYPE 1 DIABETES MELLITUS WITHOUT COMPLICATION (HCC): Primary | ICD-10-CM

## 2025-05-01 PROCEDURE — 99214 OFFICE O/P EST MOD 30 MIN: CPT | Performed by: NURSE PRACTITIONER

## 2025-05-01 PROCEDURE — 95251 CONT GLUC MNTR ANALYSIS I&R: CPT | Performed by: NURSE PRACTITIONER

## 2025-05-01 RX ORDER — MIRTAZAPINE 15 MG/1
15 TABLET, FILM COATED ORAL
COMMUNITY
Start: 2025-04-29

## 2025-05-01 NOTE — PROGRESS NOTES
Name: Elroy Salcido      : 2000      MRN: [id number]  Encounter Provider: CARL Arredondo  Encounter Date: 2025   Encounter department: [facility]      Assessment & Plan  Type 1 diabetes mellitus without complication (HCC)    Lab Results   Component Value Date    HGBA1C 6.8 (H) 2025       Orders:    CBC and differential; Future    Comprehensive metabolic panel; Future    Hemoglobin A1C; Future    Lipid panel; Future    Hyperglycemia    Orders:    CBC and differential; Future    Comprehensive metabolic panel; Future    Hemoglobin A1C; Future    Lipid panel; Future    Vitamin D deficiency    Orders:    CBC and differential; Future    Comprehensive metabolic panel; Future    Hemoglobin A1C; Future    Lipid panel; Future    Plan:  1. Type 1 diabetes: Hemoglobin A1c POCT is 6.8. Review of his Dexcom continuous glucose monitor and his Omnipod DASH pump download reveals some hyperglycemia after his meals at 5 PM and while eating overnight prior to bed.  For this, I have adjusted his insulin to carbohydrate ratio to 1: 7 at 5 PM and again at midnight.  He will continue on insulin to carbohydrate ratio of 1:9 from 9 AM to 5 PM.  Reviewed recognition and proper treatment of hypoglycemic episodes.  He will contact the office with any problems.  He is having some anxiety related to eating and the resulting blood sugars.  He is also following up with GI.  He will send OmniPod and Dexcom reports to the office in the next few weeks for review and adjustment, if necessary.  Check hemoglobin A1c and comprehensive metabolic panel prior to next visit.     2.  Vitamin-D deficiency: Continue supplementation with 2000 units of vitamin D3 daily.  Will continue to monitor levels over time.      History of Present Illness     24 y.o. year old male with type 1 diabetes for approximately 5 years.  He is on insulin and Omnipod DASH with a Dexcom continuous glucose  monitor.      His pump settings are as follows:  Basal  Midnight - 0.65  Insulin to carbohydrate ratio  Midnight - 1:9  Sensitivity - 50  Blood glucose target range - 110     Hemoglobin A1c is not available at the time of the visit for review.  He denies any polyuria, polydipsia, nocturia and blurry vision. He denies neuropathy, nephropathy and retinopathy.  Recent diabetic foot exam was performed in office visit on February 23, 2024.      Hypoglycemic episodes: No, rare. Does have intranasal glucagon in case of severe hypoglycemia.     Blood Sugar/Glucometer/Pump/CGM review: OmniPod DASH pump and Dexcom CGM download from April 18 through May 1, 2025 reveals a average glucose of 184 with a standard deviation of 56.  He is in target range 57% of the time with 43% elevated readings and less than <1% low readings.  He is utilizing 35.5 units of insulin per day.  38.6% of which is bolus and 61.4% is basal.     For his vitamin D deficiency, he is not currently supplementing his vitamin D.  His most recent 25-hydroxy vitamin D level from May 29, 2024 is 22.4.    Last Eye Exam: Not on file  Last Foot Exam: 02/23/2024  Health Maintenance   Topic Date Due    Diabetic Foot Exam  02/23/2025    Diabetic Eye Exam  08/29/2025 (Originally 10/6/2010)           Review of Systems   Constitutional: Negative.  Negative for chills, fatigue and fever.   HENT: Negative.  Negative for trouble swallowing and voice change.    Eyes:  Negative for photophobia, pain, discharge, redness, itching and visual disturbance.   Respiratory:  Negative for cough and shortness of breath.    Cardiovascular:  Negative for chest pain and palpitations.   Gastrointestinal:  Negative for abdominal pain, constipation, diarrhea, nausea and vomiting.   Endocrine: Negative for cold intolerance, heat intolerance, polydipsia, polyphagia and polyuria.   Genitourinary: Negative.    Musculoskeletal: Negative.    Skin: Negative.    Allergic/Immunologic: Negative.   "  Neurological:  Negative for dizziness, syncope, light-headedness and headaches.   Hematological: Negative.    Psychiatric/Behavioral: Negative.     All other systems reviewed and are negative.   as per HPI        Objective   /80   Pulse 92   Ht 5' 11\" (1.803 m)   Wt 66.6 kg (146 lb 12.8 oz)   BMI 20.47 kg/m²      Body mass index is 20.47 kg/m².  Wt Readings from Last 3 Encounters:   05/01/25 66.6 kg (146 lb 12.8 oz)   01/22/25 58.8 kg (129 lb 9.6 oz)   11/25/24 59 kg (130 lb)     Physical Exam  Vitals reviewed.   Constitutional:       Appearance: He is well-developed.   HENT:      Head: Normocephalic and atraumatic.      Nose: Nose normal.   Eyes:      Conjunctiva/sclera: Conjunctivae normal.      Pupils: Pupils are equal, round, and reactive to light.   Cardiovascular:      Rate and Rhythm: Normal rate and regular rhythm.      Pulses: no weak pulses.           Dorsalis pedis pulses are 1+ on the right side and 1+ on the left side.        Posterior tibial pulses are 1+ on the right side and 1+ on the left side.      Heart sounds: Normal heart sounds.   Pulmonary:      Effort: Pulmonary effort is normal.      Breath sounds: Normal breath sounds.   Abdominal:      General: Bowel sounds are normal.      Palpations: Abdomen is soft.   Musculoskeletal:         General: Normal range of motion.      Cervical back: Normal range of motion and neck supple.   Feet:      Right foot:      Skin integrity: No ulcer, skin breakdown, erythema, warmth, callus or dry skin.      Left foot:      Skin integrity: No ulcer, skin breakdown, erythema, warmth, callus or dry skin.   Skin:     General: Skin is warm and dry.   Neurological:      Mental Status: He is alert and oriented to person, place, and time.   Psychiatric:         Behavior: Behavior normal.         Thought Content: Thought content normal.         Judgment: Judgment normal.     Patient's shoes and socks removed.    Right Foot/Ankle   Right Foot Inspection  Skin " Exam: skin normal and skin intact. No dry skin, no warmth, no callus, no erythema, no maceration, no abnormal color, no pre-ulcer, no ulcer and no callus.     Toe Exam: ROM and strength within normal limits.     Sensory   Monofilament testing: intact    Vascular  Capillary refills: < 3 seconds  The right DP pulse is 1+. The right PT pulse is 1+.     Left Foot/Ankle  Left Foot Inspection  Skin Exam: skin normal and skin intact. No dry skin, no warmth, no erythema, no maceration, normal color, no pre-ulcer, no ulcer and no callus.     Toe Exam: ROM and strength within normal limits.     Sensory   Monofilament testing: intact    Vascular  Capillary refills: < 3 seconds  The left DP pulse is 1+. The left PT pulse is 1+.     Assign Risk Category  No deformity present  No loss of protective sensation  No weak pulses  Risk: 0      Labs:   Lab Results   Component Value Date    HGBA1C 6.8 (H) 04/29/2025    HGBA1C 6.0 01/22/2025    HGBA1C 5.9 (H) 05/29/2024     Lab Results   Component Value Date    CREATININE 0.87 04/29/2025    CREATININE 0.96 05/29/2024    CREATININE 0.93 02/19/2024    BUN 22 (H) 04/29/2025    K 4.3 04/29/2025     04/29/2025    CO2 23 04/29/2025     eGFR   Date Value Ref Range Status   04/29/2025 124 >59 mL/min/1.73 Final     Lab Results   Component Value Date    HDL 45 05/29/2024    TRIG 101 05/29/2024    CHOLHDL 8.7 (H) 04/11/2022     Lab Results   Component Value Date    ALT 22 04/29/2025    AST 15 04/29/2025         There are no Patient Instructions on file for this visit.    Discussed with the patient and all questioned fully answered. He will call me if any problems arise.

## 2025-05-01 NOTE — PATIENT INSTRUCTIONS
Stay active and stay hydrated.     We made changes to your pump settings at both midnight and 5 PM.     Continue to utilize the OmniPod and the Dexcom continuous glucose monitor.     Please contact the office for a pump and sensor report in 2-3 weeks.    Sooner if blood sugar readings are consistently low.     Continue to follow up with GI.

## 2025-05-01 NOTE — ASSESSMENT & PLAN NOTE
Lab Results   Component Value Date    HGBA1C 6.8 (H) 04/29/2025       Orders:    CBC and differential; Future    Comprehensive metabolic panel; Future    Hemoglobin A1C; Future    Lipid panel; Future

## 2025-08-05 LAB
ALBUMIN SERPL-MCNC: 4.7 G/DL (ref 4.3–5.2)
ALP SERPL-CCNC: 73 IU/L (ref 44–121)
ALT SERPL-CCNC: 20 IU/L (ref 0–44)
AST SERPL-CCNC: 16 IU/L (ref 0–40)
BASOPHILS # BLD AUTO: 0.1 X10E3/UL (ref 0–0.2)
BASOPHILS NFR BLD AUTO: 1 %
BILIRUB SERPL-MCNC: 0.6 MG/DL (ref 0–1.2)
BUN SERPL-MCNC: 17 MG/DL (ref 6–20)
BUN/CREAT SERPL: 19 (ref 9–20)
CALCIUM SERPL-MCNC: 9.5 MG/DL (ref 8.7–10.2)
CHLORIDE SERPL-SCNC: 101 MMOL/L (ref 96–106)
CHOLEST SERPL-MCNC: 182 MG/DL (ref 100–199)
CO2 SERPL-SCNC: 24 MMOL/L (ref 20–29)
CREAT SERPL-MCNC: 0.9 MG/DL (ref 0.76–1.27)
EGFR: 122 ML/MIN/1.73
EOSINOPHIL # BLD AUTO: 0.1 X10E3/UL (ref 0–0.4)
EOSINOPHIL NFR BLD AUTO: 2 %
ERYTHROCYTE [DISTWIDTH] IN BLOOD BY AUTOMATED COUNT: 12.3 % (ref 11.6–15.4)
GLOBULIN SER-MCNC: 1.9 G/DL (ref 1.5–4.5)
GLUCOSE SERPL-MCNC: 134 MG/DL (ref 70–99)
HBA1C MFR BLD: 6.6 % (ref 4.8–5.6)
HCT VFR BLD AUTO: 41.1 % (ref 37.5–51)
HDLC SERPL-MCNC: 47 MG/DL
HGB BLD-MCNC: 14.1 G/DL (ref 13–17.7)
IMM GRANULOCYTES # BLD: 0 X10E3/UL (ref 0–0.1)
IMM GRANULOCYTES NFR BLD: 0 %
LDLC SERPL CALC-MCNC: 122 MG/DL (ref 0–99)
LYMPHOCYTES # BLD AUTO: 2.6 X10E3/UL (ref 0.7–3.1)
LYMPHOCYTES NFR BLD AUTO: 42 %
MCH RBC QN AUTO: 29.9 PG (ref 26.6–33)
MCHC RBC AUTO-ENTMCNC: 34.3 G/DL (ref 31.5–35.7)
MCV RBC AUTO: 87 FL (ref 79–97)
MONOCYTES # BLD AUTO: 0.5 X10E3/UL (ref 0.1–0.9)
MONOCYTES NFR BLD AUTO: 8 %
NEUTROPHILS # BLD AUTO: 2.9 X10E3/UL (ref 1.4–7)
NEUTROPHILS NFR BLD AUTO: 47 %
PLATELET # BLD AUTO: 194 X10E3/UL (ref 150–450)
POTASSIUM SERPL-SCNC: 4.3 MMOL/L (ref 3.5–5.2)
PROT SERPL-MCNC: 6.6 G/DL (ref 6–8.5)
RBC # BLD AUTO: 4.72 X10E6/UL (ref 4.14–5.8)
SL AMB VLDL CHOLESTEROL CALC: 13 MG/DL (ref 5–40)
SODIUM SERPL-SCNC: 139 MMOL/L (ref 134–144)
TRIGL SERPL-MCNC: 67 MG/DL (ref 0–149)
WBC # BLD AUTO: 6.1 X10E3/UL (ref 3.4–10.8)

## 2025-08-07 ENCOUNTER — OFFICE VISIT (OUTPATIENT)
Dept: ENDOCRINOLOGY | Facility: HOSPITAL | Age: 25
End: 2025-08-07
Payer: COMMERCIAL

## 2025-08-07 VITALS
HEIGHT: 71 IN | HEART RATE: 129 BPM | WEIGHT: 149 LBS | SYSTOLIC BLOOD PRESSURE: 118 MMHG | OXYGEN SATURATION: 98 % | DIASTOLIC BLOOD PRESSURE: 68 MMHG | BODY MASS INDEX: 20.86 KG/M2

## 2025-08-07 DIAGNOSIS — E10.9 TYPE 1 DIABETES MELLITUS WITHOUT COMPLICATION (HCC): ICD-10-CM

## 2025-08-07 DIAGNOSIS — E10.9 TYPE 1 DIABETES MELLITUS WITHOUT COMPLICATION (HCC): Primary | ICD-10-CM

## 2025-08-07 DIAGNOSIS — R73.9 HYPERGLYCEMIA: ICD-10-CM

## 2025-08-07 DIAGNOSIS — E55.9 VITAMIN D DEFICIENCY: ICD-10-CM

## 2025-08-07 PROCEDURE — 99214 OFFICE O/P EST MOD 30 MIN: CPT | Performed by: NURSE PRACTITIONER

## 2025-08-07 PROCEDURE — 95251 CONT GLUC MNTR ANALYSIS I&R: CPT | Performed by: NURSE PRACTITIONER

## 2025-08-07 RX ORDER — INSULIN LISPRO 100 [IU]/ML
INJECTION, SOLUTION INTRAVENOUS; SUBCUTANEOUS
Qty: 50 ML | Refills: 3 | Status: SHIPPED | OUTPATIENT
Start: 2025-08-07

## 2025-08-07 RX ORDER — MIDODRINE HYDROCHLORIDE 5 MG/1
5 TABLET ORAL 2 TIMES DAILY
COMMUNITY
Start: 2025-05-23 | End: 2026-05-23

## 2025-08-07 RX ORDER — CARIPRAZINE 3 MG/1
1 CAPSULE, GELATIN COATED ORAL DAILY
COMMUNITY
Start: 2025-07-02